# Patient Record
Sex: MALE
[De-identification: names, ages, dates, MRNs, and addresses within clinical notes are randomized per-mention and may not be internally consistent; named-entity substitution may affect disease eponyms.]

---

## 2022-11-29 ENCOUNTER — HOSPITAL ENCOUNTER (INPATIENT)
Dept: HOSPITAL 97 - ER | Age: 77
LOS: 4 days | Discharge: HOME | DRG: 291 | End: 2022-12-03
Attending: HOSPITALIST | Admitting: HOSPITALIST
Payer: COMMERCIAL

## 2022-11-29 VITALS — BODY MASS INDEX: 26.3 KG/M2

## 2022-11-29 DIAGNOSIS — R77.8: ICD-10-CM

## 2022-11-29 DIAGNOSIS — I27.20: ICD-10-CM

## 2022-11-29 DIAGNOSIS — Z79.899: ICD-10-CM

## 2022-11-29 DIAGNOSIS — D63.8: ICD-10-CM

## 2022-11-29 DIAGNOSIS — I48.91: ICD-10-CM

## 2022-11-29 DIAGNOSIS — D63.1: ICD-10-CM

## 2022-11-29 DIAGNOSIS — I43: ICD-10-CM

## 2022-11-29 DIAGNOSIS — Z79.84: ICD-10-CM

## 2022-11-29 DIAGNOSIS — N18.31: ICD-10-CM

## 2022-11-29 DIAGNOSIS — Z20.822: ICD-10-CM

## 2022-11-29 DIAGNOSIS — I50.23: ICD-10-CM

## 2022-11-29 DIAGNOSIS — Z79.01: ICD-10-CM

## 2022-11-29 DIAGNOSIS — I13.0: Primary | ICD-10-CM

## 2022-11-29 DIAGNOSIS — K21.9: ICD-10-CM

## 2022-11-29 DIAGNOSIS — E78.5: ICD-10-CM

## 2022-11-29 DIAGNOSIS — I24.8: ICD-10-CM

## 2022-11-29 DIAGNOSIS — E11.22: ICD-10-CM

## 2022-11-29 LAB
ALBUMIN SERPL BCP-MCNC: 3.1 G/DL (ref 3.4–5)
ALP SERPL-CCNC: 117 U/L (ref 45–117)
ALT SERPL W P-5'-P-CCNC: 18 U/L (ref 12–78)
AST SERPL W P-5'-P-CCNC: 20 U/L (ref 15–37)
BUN BLD-MCNC: 26 MG/DL (ref 7–18)
GLUCOSE SERPLBLD-MCNC: 220 MG/DL (ref 74–106)
HCT VFR BLD CALC: 38.7 % (ref 39.6–49)
INR BLD: 1.3
INR BLD: 1.35
LYMPHOCYTES # SPEC AUTO: 1.2 K/UL (ref 0.7–4.9)
MAGNESIUM SERPL-MCNC: 1.7 MG/DL (ref 1.8–2.4)
MCV RBC: 85.1 FL (ref 80–100)
PMV BLD: 10.2 FL (ref 7.6–11.3)
POTASSIUM SERPL-SCNC: 4.4 MMOL/L (ref 3.5–5.1)
RBC # BLD: 4.55 M/UL (ref 4.33–5.43)
TROPONIN I SERPL HS-MCNC: 185.5 PG/ML (ref ?–58.9)
TSH SERPL DL<=0.05 MIU/L-ACNC: 1.41 UIU/ML (ref 0.36–3.74)

## 2022-11-29 PROCEDURE — 94762 N-INVAS EAR/PLS OXIMTRY CONT: CPT

## 2022-11-29 PROCEDURE — 96375 TX/PRO/DX INJ NEW DRUG ADDON: CPT

## 2022-11-29 PROCEDURE — 84439 ASSAY OF FREE THYROXINE: CPT

## 2022-11-29 PROCEDURE — 71045 X-RAY EXAM CHEST 1 VIEW: CPT

## 2022-11-29 PROCEDURE — 85730 THROMBOPLASTIN TIME PARTIAL: CPT

## 2022-11-29 PROCEDURE — 80053 COMPREHEN METABOLIC PANEL: CPT

## 2022-11-29 PROCEDURE — 36415 COLL VENOUS BLD VENIPUNCTURE: CPT

## 2022-11-29 PROCEDURE — 93005 ELECTROCARDIOGRAM TRACING: CPT

## 2022-11-29 PROCEDURE — 84100 ASSAY OF PHOSPHORUS: CPT

## 2022-11-29 PROCEDURE — 99285 EMERGENCY DEPT VISIT HI MDM: CPT

## 2022-11-29 PROCEDURE — 85025 COMPLETE CBC W/AUTO DIFF WBC: CPT

## 2022-11-29 PROCEDURE — 84443 ASSAY THYROID STIM HORMONE: CPT

## 2022-11-29 PROCEDURE — 94003 VENT MGMT INPAT SUBQ DAY: CPT

## 2022-11-29 PROCEDURE — 94002 VENT MGMT INPAT INIT DAY: CPT

## 2022-11-29 PROCEDURE — 80076 HEPATIC FUNCTION PANEL: CPT

## 2022-11-29 PROCEDURE — 84484 ASSAY OF TROPONIN QUANT: CPT

## 2022-11-29 PROCEDURE — 81003 URINALYSIS AUTO W/O SCOPE: CPT

## 2022-11-29 PROCEDURE — 83735 ASSAY OF MAGNESIUM: CPT

## 2022-11-29 PROCEDURE — 87040 BLOOD CULTURE FOR BACTERIA: CPT

## 2022-11-29 PROCEDURE — 85610 PROTHROMBIN TIME: CPT

## 2022-11-29 PROCEDURE — 80048 BASIC METABOLIC PNL TOTAL CA: CPT

## 2022-11-29 PROCEDURE — 97116 GAIT TRAINING THERAPY: CPT

## 2022-11-29 PROCEDURE — 83880 ASSAY OF NATRIURETIC PEPTIDE: CPT

## 2022-11-29 PROCEDURE — 87811 SARS-COV-2 COVID19 W/OPTIC: CPT

## 2022-11-29 PROCEDURE — 97161 PT EVAL LOW COMPLEX 20 MIN: CPT

## 2022-11-29 PROCEDURE — 96374 THER/PROPH/DIAG INJ IV PUSH: CPT

## 2022-11-29 PROCEDURE — 82550 ASSAY OF CK (CPK): CPT

## 2022-11-29 PROCEDURE — 93306 TTE W/DOPPLER COMPLETE: CPT

## 2022-11-29 PROCEDURE — 82947 ASSAY GLUCOSE BLOOD QUANT: CPT

## 2022-11-29 PROCEDURE — 94760 N-INVAS EAR/PLS OXIMETRY 1: CPT

## 2022-11-29 PROCEDURE — 97530 THERAPEUTIC ACTIVITIES: CPT

## 2022-11-29 RX ADMIN — METOPROLOL TARTRATE SCH MG: 25 TABLET ORAL at 22:32

## 2022-11-29 RX ADMIN — Medication SCH ML: at 22:32

## 2022-11-29 RX ADMIN — HEPARIN SODIUM AND DEXTROSE SCH MLS: 5000; 5 INJECTION INTRAVENOUS at 23:42

## 2022-11-29 RX ADMIN — HUMAN INSULIN SCH: 100 INJECTION, SOLUTION SUBCUTANEOUS at 21:00

## 2022-11-29 RX ADMIN — SODIUM CHLORIDE SCH MG: 0.9 INJECTION, SOLUTION INTRAVENOUS at 22:33

## 2022-11-29 NOTE — XMS REPORT
Continuity of Care Document

                          Created on:2022



Patient:REYES, LUCAS

Sex:Male

:1945

External Reference #:[id number]





Demographics







                          Address                   [address] [place] [address]

 

                          Home Phone                [phone]

 

                          Work Phone                [phone]

 

                          Mobile Phone              [phone]

 

                          Email Address             NONE

 

                          Preferred Language        en

 

                          Marital Status            Unknown

 

                          Moravian Affiliation     Unknown

 

                          Race                      Unknown

 

                          Additional Race(s)        White

 

                          Ethnic Group              Not  or 









Author







                          Organization              [facility]

t

 

                          Address                   [address]



                                                    [place] [address]

 

                          Phone                     [phone]









Support







                Name            Relationship    Address         Phone

 

                Reyes, Lenor    Spouse          [address]   [phone]



                                                [place] [address] 









Care Team Providers







                    Name                Role                Phone

 

                    Clinton Valdez ([place]) Primary Care Physician [phone]

 

                    MARIA LUISA CARDENAS    Attending Clinician Unavailable

 

                    Doctor Unassigned, No Name Attending Clinician Unavailable

 

                    Arielle Peralta RN     Attending Clinician [phone]

 

                    Maria Luisa Cardenas MD Attending Clinician [phone]

 

                    Loretta Montalvo MD   Attending Clinician [phone]

 

                    Harshad Lackey DO    Attending Clinician [phone]

 

                    LORETTA MONTALVO      Admitting Clinician Unavailable

 

                    Loretta Montalvo MD   Admitting Clinician [phone]









Payers







           Payer Name Policy Type Policy Number Effective Date Expiration Date S

[employer]

 

           COMMERCIAL            [id number]  2014            



           NON-CONTRACT                       00:00:00              



           GENERIC                                                







Problems







       Condition Condition Condition Status Onset  Resolution Last   Treating Co

mments 

Source



       Name   Details Category        Date   Date   Treatment Clinician        



                                                 Date                 

 

       Atrial Atrial Disease Active                              [facility]



       fibrillati fibrillati                                              it

y of



       on with on with               00::                             [place]



       [facility]    [facility]                                                   [facility]



                                                                      Branch

 

       Essential Essential Disease Active                              Uni

vers



       hypertensi hypertensi                                              it

y of



       on     on                   00:00:                             [place]



                                                                    Medical



                                                                      Branch

 

       Cardiomyop Cardiomyop Disease Active                              U

nivers



       athy   athy                                                ity of



                                   00:00:                             [place]



                                                                    Medical



                                                                      Branch

 

       Dyslipidem Dyslipidem Disease Active 2021-0                             U

nivers



       ia     ia                                                  ity of



                                   00:00:                             [place]



                                                                    Medical



                                                                      Branch

 

       Elevated Elevated Disease Active                              Unive

rs



       troponin I troponin I                                              it

y of



       level  level                00:00:                             [place]



                                   00                                 Medical



                                                                      Branch

 

       Myocardial Myocardial Disease Active                              U

nivers



       infarction infarction                                              it

y of



       type 2 type 2               00:00:                             [place]



                                                                    Medical



                                                                      Branch

 

       Acute  Acute  Disease Active                              [facility]



       combined combined                                              ity of



       systolic systolic               00:00:                             [place]



       and    and                  00                                 Medical



       diastolic diastolic                                                  [facility]

ch



       CHF, NYHA CHF, NYHA                                                  



       class 3 class 3                                                  

 

       Stage 3 Stage 3 Disease Active                              [facility]



       chronic chronic                                              ity of



       kidney kidney               00:00:                             [place]



       disease disease               00                                 [facility]

 

       Pulmonary Pulmonary Disease Active                              Uni

vers



       hypertensi hypertensi                                              it

y of



       on     on                   00:00:                             [place]



                                                                    [facility]

 

       Pulmonary Pulmonary Disease Active                              Uni

vers



       edema, edema,                                              ity of



       acute  acute                00:00:                             [place]



                                                                    [facility]







Allergies, Adverse Reactions, Alerts







       Allergy Allergy Status Severity Reaction(s) Onset  Inactive Treating Comm

ents 

Source



       Name   Type                        Date   Date   Clinician        

 

       NO KNOWN Drug   Active                                           [facility]



       ALLERGIE Class                                                   ity of



       S                                                              [facility]







Social History







           Social Habit Start Date Stop Date  Quantity   Comments   Source

 

           Exposure to                       Not sure              [facility]



           SARS-CoV-2                                             [facility]



           (event)                                                Branch

 

           Education  2021 15                    [facility] of



                      00:00:00   00:00:00                         [facility]

 

           Tobacco Comment 2021 QUIT 30 YEARS            Univer

sity of



                      00:00:00   00:00:00   AGO                   [facility]

 

           Sex Assigned At 1945                       [facility]

y of



           Birth      00:00:00   00:00:00                         [facility]









                Smoking Status  Start Date      Stop Date       Source

 

                Former smoker   2021 00:00:00 2021 00:00:00 Univers[facility]

ty of [facility]







Medications







       Ordered Filled Start  Stop   Current Ordering Indication Dosage Frequency

 Signature

                    Comments            Components          Source



     Medication Medication Date Date Medication? Clinician                (SIG) 

          



     Name Name                                                   

 

     ergocalcife      2021- No        [id number]      Take 1        

   [facility]



     rol,                                capsule by           ity of



     vitamin d2,      00:00: 04:59                          mouth           [place]

[facility]



     1,250 mcg      00   :00                           weekly for           Medi

[place]



     (50,000                                         30 days.           Branch



     unit)                                                        



     capsule                                                        

 

     ergocalcife      -0 - No        [id number]      Take 1        

   [facility]



     rol,                                capsule by           ity of



     vitamin d2,      00:00: 04:59                          mouth           [place]

[facility]



     1,250 mcg      00   :00                           weekly for           Medi

[place]lee



     (50,000                                         30 days.           Branch



     unit)                                                        



     capsule                                                        

 

     ergocalcife      -0 - No        [id number]      Take 1        

   [facility]



     rol,                                capsule by           ity of



     vitamin d2,      00:00: 04:59                          mouth           [place]

[facility]



     1,250 mcg      00   :00                           weekly for           Medi

[place]lee



     (50,000                                         30 days.           Branch



     unit)                                                        



     capsule                                                        

 

     KCL             Yes            20meq      20 mEq,           [facility]



     (KLOR-CON      8-03                               Oral,           ity of



     M20) tablet      14:00:                               DAILY,           [place]

[facility]



     20 mEq      00                                 First dose           Medical



                                                  on            [facility]



                                                  8/3/21 at           



                                                  0900,           



                                                  Until           



                                                  Discontinu           



                                                  ed,            



                                                  Routine           

 

     metformin            Yes            500mg      Take 500           Uni

vers



     HCl       8-03                               mg by           ity of



     (METFORMIN      00:40:                               mouth 2           [place]

s



     ORAL)      55                                 (two)           Medical



                                                  times           Branch



                                                  daily.           

 

     allopurinoL            Yes            300mg      Take 300           U

nivers



     300 mg      8-03                               mg by           ity of



     tablet      00:40:                               mouth           [place]



               55                                 daily.           Medical



                                                                 Branch

 

     atorvastati            Yes            20mg      Take 20 mg           

Univers



     n 20 mg      8-03                               by mouth           ity of



     tablet      00:40:                               at             [place]



               55                                 bedtime.           Medical



                                                                 Branch

 

     metformin            Yes            500mg      Take 500           Uni

vers



     HCl       8-03                               mg by           ity of



     (METFORMIN      00:40:                               mouth 2           [place]

s



     ORAL)      55                                 (two)           Medical



                                                  times           Branch



                                                  daily.           

 

     allopurinoL            Yes            300mg      Take 300           U

nivers



     300 mg      8-03                               mg by           ity of



     tablet      00:40:                               mouth           [place]



               55                                 daily.           Medical



                                                                 Branch

 

     atorvastati            Yes            20mg      Take 20 mg           

Univers



     n 20 mg      8-03                               by mouth           ity of



     tablet      00:40:                               at             [place]



               55                                 bedtime.           Medical



                                                                 Branch

 

     metformin            Yes            500mg      Take 500           Uni

vers



     HCl       8-03                               mg by           ity of



     (METFORMIN      00:40:                               mouth 2           [place]

s



     ORAL)      55                                 (two)           Medical



                                                  times           Branch



                                                  daily.           

 

     allopurinoL            Yes            300mg      Take 300           U

nivers



     300 mg      8-03                               mg by           ity of



     tablet      00:40:                               mouth           [place]



               55                                 daily.           Medical



                                                                 Branch

 

     atorvastati            Yes            20mg      Take 20 mg           

Univers



     n 20 mg      03                               by mouth           ity of



     tablet      00:40:                               at             [place]



               55                                 bedtime.           Medical



                                                                 Branch

 

     atenoloL      2021- No        [id number] 100mg      Take 1           U

nivers



     100 mg                                tablet by           ity of



     tablet      00:00: 04:59                          mouth           [place]



               00   :00                           daily for           Medical



                                                  30 days.           Branch

 

     KCL 20 mEq      2021- No        [id number] 20meq      Take 1          

 Univers



     tablet                                tablet by           ity of



               00:00: 04:59                          mouth           [place]



               00   :00                           daily for           Medical



                                                  30 days.           Branch

 

     NIFEdipine      2021- No        [id number] 90mg      Take 1           

Univers



     ER 90 mg                                tablet by           ity o

f



     tablet      00:00: 04:59                          mouth           [place]



               00   :00                           daily for           Medical



                                                  30 days.           Branch

 

     atenoloL      2021- No        [id number] 100mg      Take 1           U

nivers



     100 mg                                tablet by           ity of



     tablet      00:00: 04:59                          mouth           [place]



               00   :00                           daily for           Medical



                                                  30 days.           Branch

 

     KCL 20 mEq      2021- No        [id number] 20meq      Take 1          

 Univers



     tablet                                tablet by           ity of



               00:00: 04:59                          mouth           [place]



               00   :00                           daily for           Medical



                                                  30 days.           Branch

 

     NIFEdipine      2021- No        [id number] 90mg      Take 1           

[facility]



     ER 90 mg                                tablet by           ity o

f



     tablet      00:00: 04:59                          mouth           [place]



               00   :00                           daily for           Medical



                                                  30 days.           Branch

 

     atenoloL      2021- No        [id number] 100mg      Take 1           U

nivers



     100 mg                                tablet by           ity of



     tablet      00:00: 04:59                          mouth           [place]



               00   :00                           daily for           Medical



                                                  30 days.           Branch

 

     KCL 20 mEq      2021- No        [id number] 20meq      Take 1          

 Univers



     tablet                                tablet by           ity of



               00:00: 04:59                          mouth           [place]



               00   :00                           daily for           Medical



                                                  30 days.           Branch

 

     NIFEdipine      2021- No        [id number] 90mg      Take 1           

Univers



     ER 90 mg                                tablet by           ity o

f



     tablet      00:00: 04:59                          mouth           [place]



               00   :00                           daily for           Medical



                                                  30 days.           Branch

 

     atenoloL      2021- No             100mg      Take 100           Uni

vers



     100 mg                                mg by           ity of



     tablet      19:51: 00:00                          mouth           [place]



               58   :00                           daily.           Medical



                                                                 Branch

 

     NIFEdipine      2021- No             90mg      Take 90 mg           

[facility]



     ER 90 mg                                by mouth           ity of



     tablet      19:51: 00:00                          daily.           [place]



               58   :00                                          Medical



                                                                 Branch

 

     ergocalcife      2021- No             [id number]      Take 1,250        

   [facility]



     rol,                                mcg by           ity of



     vitamin D2,      19:51: 00:00                          mouth           [place]

[facility]



     (ERGOCAL)      58   :00                           weekly.           Medical



     62.5 mcg                                                        Branch



     (2,500                                                        



     unit) Cap                                                        

 

     hydrALAZINE      2021- No             10mg      Take 10 mg          

 [facility]



     10 mg                                by mouth 3           ity of



     tablet      19:51: 00:00                          (three)           [place]



               58   :00                           times           Medical



                                                  daily.           Branch

 

     metformin            Yes            500mg      Take 500           Uni

vers



     HCl       8-02                               mg by           ity of



     (METFORMIN      19:40:                               mouth 2           [place]

s



     ORAL)      55                                 (two)           Medical



                                                  times           Branch



                                                  daily.           

 

     allopurinoL            Yes            300mg      Take 300           U

nivers



     300 mg      8-02                               mg by           ity of



     tablet      19:40:                               mouth           [place]



               55                                 daily.           Medical



                                                                 Branch

 

     atorvastati            Yes            20mg      Take 20 mg           

Univers



     n 20 mg      02                               by mouth           ity of



     tablet      19:40:                               at             [place]



               55                                 bedtime.           Medical



                                                                 Branch

 

     apixaban            Yes            5mg       5 mg,           [facility]



     (ELIQUIS)      8-                               Oral, BID,           ity 

of



     tablet 5 mg      17:44:                               First dose           

[place]



               21                                 on Mon           Medical



                                                  21 at           [facility]



                                                  ,           



                                                  Until           



                                                  Discontinu           



                                                  ed,            



                                                  Routine           

 

     furosemide            Yes       [id number] 40mg      Take 1           U

nivers



     40 mg      8-02                               tablet by           ity of



     tablet      00:00:                               mouth           [place]



               00                                 daily.           Medical



                                                                 Branch

 

     furosemide      -      Yes       [id number] 40mg      Take 1           U

nivers



     40 mg      8-02                               tablet by           ity of



     tablet      00:00:                               mouth           [place]



               00                                 daily.           Medical



                                                                 Branch

 

     furosemide      -0      Yes       [id number] 40mg      Take 1           U

nivers



     40 mg      8-                               tablet by           ity of



     tablet      00:00:                               mouth           [place]



               00                                 daily.           Medical



                                                                 Branch

 

     furosemide            Yes       [id number] 40mg      Take 1           U

nivers



     40 mg      8-                               tablet by           ity of



     tablet      00:00:                               mouth           [place]



               00                                 daily.           Medical



                                                                 Branch

 

     apixaban 5      -2021- No        1475 5mg       Take 1           Univ

ers



     mg tablet                                tablet by           ity 

of



               00:00: 04:59                          mouth 2           [place]



               00   :00                           (two)           Medical



                                                  times           [facility]



                                                  daily for           



                                                  30 days.           



                                                  Indication           



                                                  s: DVT           



                                                  prevention           

 

     hydrALAZINE      -2021- No        [id number] 25mg      Take 1          

 [facility]



     25 mg                                tablet by           [facility] of



     tablet      00:00: 04:59                          mouth 2           [place]



               00   :00                           (two)           Medical



                                                  times           [facility]



                                                  daily for           



                                                  30 days.           

 

     apixaban 5      -2021- No        1475 5mg       Take 1           [facility]

ers



     mg tablet                                tablet by           ity 

of



               00:00: 04:59                          mouth 2           [place]



               00   :00                           (two)           Medical



                                                  times           [facility]



                                                  daily for           



                                                  30 days.           



                                                  Indication           



                                                  s: DVT           



                                                  prevention           

 

     hydrALAZINE      -2021- No        [id number] 25mg      Take 1          

 [facility]



     25 mg                                tablet by           [facility]



     tablet      00:00: 04:59                          mouth 2           [place]



               00   :00                           (two)           Medical



                                                  times           [facility]



                                                  daily for           



                                                  30 days.           

 

     apixaban 5      2021- No        1475 5mg       Take 1           Univ

ers



     mg tablet                                tablet by           ity 

of



               00:00: 04:59                          mouth 2           [place]



               00   :00                           (two)           Medical



                                                  times           [facility]



                                                  daily for           



                                                  30 days.           



                                                  Indication           



                                                  s: DVT           



                                                  prevention           

 

     hydrALAZINE      -2021- No        [id number] 25mg      Take 1          

 [facility]



     25 mg      -                          tablet by           ity of



     tablet      00:00: 04:59                          mouth 2           [place]



               00   :00                           (two)           Medical



                                                  times           [facility]



                                                  daily for           



                                                  30 days.           

 

     furosemide      -2021- No        [id number] 40mg      Take 1           

[facility]



     40 mg                                tablet by           ity of



     tablet      00:00: 00:00                          mouth           [place]



               00   :00                           every           Medical



                                                  morning           [facility]



                                                  and            



                                                  evening           



                                                  for 30           



                                                  days.           

 

     hydrALAZINE            Yes            25mg      25 mg,           Univ

ers



     (APRESOLINE                                     Oral, BID,           it

y of



     ) tablet 25      01:00:                               First dose           

[place]



     mg        00                                 (after           Medical



                                                  last           [facility]



                                                  modificati           



                                                  on) on Sat           



                                                  21 at           



                                                  2000,           



                                                  Until           



                                                  Discontinu           



                                                  ed,            



                                                  Routine           

 

     docusate            Yes            100mg      100 mg,           Unive

rs



     (COLACE)                                     Oral, BID,           ity o

f



     capsule 100      01:00:                               First dose           

[place]



     mg        00                                 on [facility]



                                                  21 at           [facility]



                                                  2000,           



                                                  Until           



                                                  Discontinu           



                                                  ed,            



                                                  Routine           

 

     aspirin      0 202- No             81mg      81 mg,           [facility]



     chewable       0802                          Oral,           ity of



     tablet 81      14:00: 17:44                          DAILY,           [place]



     mg        00   :34                           First dose           Medical



                                                  on [facility]



                                                  21 at           



                                                  0900,           



                                                  Until           



                                                  Discontinu           



                                                  ed,            



                                                  Routine           

 

     atorvastati            Yes            20mg      20 mg,           Univ

ers



     n (LIPITOR)                                     Oral, QHS,           it

y of



     tablet 20      02:00:                               First dose           [place]

[place]



     mg        00                                 on Fri           Medical



                                                  21 at           [facility]anch



                                                  2100,           



                                                  Until           



                                                  Discontinu           



                                                  ed,            



                                                  Routine           

 

     sulfur      2021- No        [id number] 5mL       5 mL,           Unive

rs



     hexafluorid      30                          Intravenou           i

ty of



     e microsphr      19:30: 19:30                          s, ONCE, 1          

 [place]



     (LUMASON)      00   :00                           dose, Fri           Medic

al



     injection 5                                         21 at           Br

anch



     mL                                           1430,           



                                                  Routine<br           



                                                  >Faculty           



                                                  member           



                                                  approving           



                                                  Restricted           



                                                  medication           



                                                  : [employer]           

 

     magnesium      0      Yes            400mg      400 mg,           Univ

ers



     oxide                                     Oral, BID,           ity of



     (MAG-OX      18:00:                               First dose           [place]

[facility]



     400) tablet      00                                 (after           Medica

l



     400 mg                                         last           [facility]



                                                  reorder)           



                                                  on 21 at           



                                                  1300,           



                                                  Until           



                                                  Discontinu           



                                                  ed,            



                                                  Routine           

 

     NIFEdipine      0      Yes            90mg      90 mg,           Unive

rs



     ER tablet                                     Oral,           ity of



     90 mg      14:00:                               DAILY,           [place]



               00                                 First dose           Medical



                                                  on Fri           Branch



                                                  21 at           



                                                  0900,           



                                                  Until           



                                                  Discontinu           



                                                  ed,            



                                                  Routine           

 

     ergocalcife            Yes            32548R      50,000           Un

richy



     rol                                      Units,           ity of



     (vitamin      14:00:                               Oral,           [place]



     d2)       00                                 QWEEKLY,           Medical



     (CALCIFEROL                                         First dose           Br

anch



     ) capsule                                         on 21 at           



     Units                                         0900,           



                                                  Until           



                                                  Discontinu           



                                                  ed             

 

     atenoloL            Yes            100mg      100 mg,           Unive

rs



     (TENORMIN)                                     Oral,           ity of



     tablet 100      14:00:                               DAILY,           [place]



     mg        00                                 First dose           Medical



                                                  on Fri           Branch



                                                  21 at           



                                                  0900,           



                                                  Until           



                                                  Discontinu           



                                                  ed,            



                                                  Routine           

 

     allopurinoL            Yes            300mg      300 mg,           Un

richy



     (ZYLOPRIM)                                     Oral,           ity of



     tablet 300      14:00:                               DAILY,           [place]



     mg        00                                 First dose           Medical



                                                  on Fri           Branch



                                                  21 at           



                                                  0900,           



                                                  Until           



                                                  Discontinu           



                                                  ed,            



                                                  Routine           

 

     furosemide            Yes            40mg      40 mg,           Unive

rs



     (LASIX)                                     Slow IV           ity of



     injection      13:00:                               Push,           [place]



     40 mg      00                                 Q12H,           Medical



                                                  First dose           [facility]



                                                  on 21 at           



                                                  0800,           



                                                  Until           



                                                  Discontinu           



                                                  ed,            



                                                  Routine           

 

     enoxaparin      2021- No             1mg/kg      90 mg           Uni

vers



     (LOVENOX)       08-02                          (rounded           ity o

f



     injection      13:00: 17:44                          from 88.5           [facility]

[place]



     90 mg      00   :34                           mg = 1           Medical



                                                  mg/kg           Branch



                                                  ?88.5 kg),           



                                                  Subcutaneo           



                                                  , Q12H,           



                                                  First dose           



                                                  on 21 at           



                                                  0800,           



                                                  Until           



                                                  Discontinu           



                                                  ed,            



                                                  Routine           

 

     hydrALAZINE      2021- No             10mg      10 mg,           Uni

vers



     (APRESOLINE       0731                          Oral, TID,           i

ty of



     ) tablet 10      13:00: 16:29                          First dose          

 [place]



     mg        00   :21                           on Fri           Medical



                                                  21 at           Branch



                                                  0800,           



                                                  Until           



                                                  Discontinu           



                                                  ed,            



                                                  Routine           

 

     Sliding            Yes                      Subcutaneo           Univ

ers



     Scale                                     us, AC,           ity of



     Insulin-Reg      12:30:                               First dose           

[place]



     ular + Fsbg      00                                 on Fri           Medica

l



     Testing                                         21 at           Branch



                                                  0730,           



                                                  Until           



                                                  Discontinu           



                                                  ed,            



                                                  Routine           

 

     hydralAZINE            Yes            10mg      10 mg,           Univ

ers



     (APRESOLINE      7-30                               Slow IV           ity o

f



     ) injection      11:12:                               Push,           [place]



     10 mg      46                                 Q6HPRN,           Medical



                                                  Starting           [facility]



                                                  Fri            



                                                  21 at           



                                                  0612,           



                                                  Until           



                                                  Discontinu           



                                                  ed, STAT,           



                                                  DBP=>100;           



                                                  SBP=>160,           



                                                  DBP=>100;           



                                                  SBP=>180<b           



                                                  r>Indicati           



                                                  on:            



                                                  Hypertensi           



                                                  ve             



                                                  Emergency           

 

     glucagon            Yes            1mg       1 mg,           [facility]



     (GLUCAGEN      730                               Intramuscu           ity 

of



     DIAGNOSTIC      10:23:                               lar, PRN,           [facility]

[facility]



     KIT)      30                                 Starting           Medical



     injection 1                                         Fri            [facility]



     mg                                           21 at           



                                                  0523,           



                                                  Until           



                                                  Discontinu           



                                                  ed, ASAP,           



                                                  Blood           



                                                  Glucose           



                                                  &lt; or =           



                                                  70 mg/dL           



                                                  and            



                                                  patient is           



                                                  unable to           



                                                  swallow or           



                                                  has mental           



                                                  changes.           

 

     dextrose 50            Yes            25mL      25 mL,           Univ

ers



     % in water      730                               Slow IV           ity of



     (D50W)      10:23:                               Push, PRN,           [place]



     injection      30                                 Starting           Medica

l



     25 mL                                         Fri            Branch



                                                  21 at           



                                                  0523,           



                                                  Until           



                                                  Discontinu           



                                                  ed, ASAP,           



                                                  Blood           



                                                  Glucose           



                                                  &lt; or =           



                                                  70 mg/dL           



                                                  and            



                                                  patient is           



                                                  unable to           



                                                  swallow or           



                                                  has mental           



                                                  status           



                                                  changes.           

 

     ondansetron            Yes            4mg       4 mg, Slow           

[facility]



     (ZOFRAN      730                               IV Push,           ity of



     (PF))      10:23:                               Q6HPRN,           [place]



     injection 4      17                                 Starting           Medi

[place]



     mg                                           Fri            [facility]



                                                  21 at           



                                                  0523,           



                                                  Until           



                                                  Discontinu           



                                                  ed,            



                                                  Routine,           



                                                  Nausea and           



                                                  Vomiting           



                                                  (N/V)           

 

     traMADoL      2021- No             50mg      50 mg,           Univer

s



     (ULTRAM)       08                          Oral,           ity of



     tablet 50      10:23: 10:22                          Q8HPRN,           [place]

[place]



     mg        09   :09                           Starting           Medical



                                                  Fri            [facility]



                                                  21 at           



                                                  0523,           



                                                  Until Sun           



                                                  21 at           



                                                  0522,           



                                                  Routine,           



                                                  Pain           



                                                  (scale           



                                                  4-6)           

 

     acetaminoph            Yes            650mg      650 mg,           Un

richy



     en                                       Oral,           ity of



     (TYLENOL)      10:23:                               Q6HPRN,           [place]



     tablet 650      05                                 Starting           Medic

al



     mg                                           Fri            [facility]anch



                                                  21 at           



                                                  0523,           



                                                  Until           



                                                  Discontinu           



                                                  ed,            



                                                  Routine,           



                                                  Pain           



                                                  (scale           



                                                  1-3)           

 

     magnesium      2021-0 2021- No             800mg      800 mg,           Uni

vers



     oxide                                Oral, ONCE           ity of



     (MAG-OX      10:00: 08:54                          NOW, 1           [place]



     400) tablet      00   :00                           dose, Fri           Med

ical



     800 mg                                         21 at           Branch



                                                  0500,           



                                                  Routine           

 

     metoprolol      2021- No             50mg      50 mg,           Univ

ers



     tartrate                                Oral,           ity of



     (LOPRESSOR)      08:45: 07:44                          ONCE, 1           Te

x[place]



     tablet 50      00   :00                           dose, Fri           Medic

al



     mg                                           21 at           Branch



                                                  0345,           



                                                  Routine           

 

     labetaloL      2021- No             .5mg/mi      0.5-4           Uni

vers



     (NORMODYNE)                      n         mg/min           ity o

f



     200 mg in      06:40: 10:19                          (           [place]

s



     NaCl 0.9%      10   :20                           mL/hr), IV           Medi

[place]



     (NS) 100 mL                                         Infusion,           Bra

[facility]



     infusion                                         TITRATE,           



                                                  DBP Goal <           



                                                  110 mmHg,           



                                                  Starting           



                                                  21 at           



                                                  0140<br>In           



                                                  itiate           



                                                  infusion           



                                                  at 0.5           



                                                  mg/min.&nb           



                                                  sp;&nbsp;T           



                                                  itrate by           



                                                  0.5 mg/min           



                                                  every 5           



                                                  minutes to           



                                                  15 minutes           



                                                  as needed           



                                                  to achieve           



                                                  and            



                                                  maintain           



                                                  goal blood           



                                                  pressure.&           



                                                  nbsp;&nbsp           



                                                  ;Maximum           



                                                  dose = 4           



                                                  mg/min. If           



                                                  goal not           



                                                  maintained           



                                                  at maximum           



                                                  allowed           



                                                  dose,           



                                                  contact           



                                                  prescriber           



                                                  .<br>           

 

     furosemide       No             80mg      80 mg, IV           U

nivers



     (LASIX)                                Push,           ity of



     injection      05:15: 04:20                          ONCE, 1           [place]

s



     80 mg      00   :00                           dose, Fri           Medical



                                                  21 at           Branch



                                                  0015, ASAP           







Vital Signs







             Vital Name   Observation Time Observation Value Comments     Source

 

             Systolic blood 2021 15:59:00 123 mm[Hg]                Univer

sity of



             pressure                                            [facility]

 

             Diastolic blood 2021 15:59:00 67 mm[Hg]                 Unive

rsity of



             pressure                                            [facility]

 

             Heart rate   2021 15:59:00 86 /min                   [facility]

 

             Body temperature 2021 15:59:00 37.11 Conchita                 [facility]

 

             Respiratory rate 2021 15:59:00 18 /min                   [facility]

 

             Oxygen saturation in 2021 15:59:00 91 /min                   

[facility]



             Arterial blood by                                        [place] [facility]

[place]



             Pulse oximetry                                        Branch

 

             Body weight  2021 08:38:00 113.399 kg                [facility]







Procedures







                Procedure       Date / Time     Performing Clinician Source



                                Performed                       

 

                AUTHORIZATION FOR 2022 05:01:00 Doctor Unassigned, No [facility]



                RELEASE OF PHI                  Name            [facility]

 

                POCT GLUCOSE (AUTOMATED) 2021 15:58:00 [facility]

 

                POCT GLUCOSE (AUTOMATED) 2021 12:42:00 [facility]

 

                BASIC METABOLIC PANEL 2021 10:15:00 Xander Castro [facility]



                (NA, K, CL, CO2,                                 [facility]



                GLUCOSE, BUN,                                   



                CREATININE, CA)                                 

 

                N-TERMINAL PRO-BNP 2021 10:15:00 Xander Castro [facility]

 

                CBC WITH DIFF   2021 08:40:00 Xander Castro [facility]

 

                POCT GLUCOSE (AUTOMATED) 2021 23:00:00 [facility] Lu[facility]ardenas [facility]

[facility]

 

                POCT GLUCOSE (AUTOMATED) 2021 16:33:00 [facility] Lu[facility]ardenas [facility]

[facility]

 

                BASIC METABOLIC PANEL 2021 14:45:00 [facility]uan Sol[facility]ario [facility] 

[facility]



                (NA, K, CL, CO2,                                 [facility]



                GLUCOSE, BUN,                                   



                CREATININE, CA)                                 

 

                N-TERMINAL PRO-BNP 2021 14:45:00 Tuan Solitario [facility]

 

                POCT GLUCOSE (AUTOMATED) 2021 12:42:00 [facility] Lu[facility]ardenas [facility]

 

                POCT GLUCOSE (AUTOMATED) 2021 08:56:00 Maria Luisa Cardenas Un

iversity [facility]

 

                POCT GLUCOSE (AUTOMATED) 2021 21:29:00 [facility]ardenas [facility]

[facility]

 

                POCT GLUCOSE (AUTOMATED) 2021 16:57:00 [facility] Lu[facility] Cardenas [facility]

[facility]

 

                POCT GLUCOSE (AUTOMATED) 2021 12:55:00 Maria Lu[facility] Cardenas [facility]

[facility]

 

                [facility]       [facility]

 

                TROPONIN I      [facility]59[facility]

 

                THYROID STIMULATING 2021 08:59:00 Tuan Solitario [facility]

 

                BASIC [facility]ABOLIC PANEL 2021 [facility]:59[facility]



                (NA, K, CL, CO2,                                 [facility] Branch



                GLUCOSE, BUN,                                   



                CREATININE, CA)                                 

 

                CBC WITH DIFF   2021 08:59:00 [facility]

 

                N-TERMINAL PRO[facility]BNP 2021 08:59:00 [facility]

 

                POCT GLUCOSE (AUTOMATED) 2021 21:32:00 [facility]arden[facility] 

[facility]

 

                TRANSTHORACIC ECHO (TTE) 2021 19:17:26 Tuan Solitario

 [facility]



                COMPLETE W/ CONTRAST                                 Medical Bra

[facility]

 

                POCT GLUCOSE (AUTOMATED) 2021 16:20:00 [facility]arden[facility] 

[facility]

 

                POCT GLUCOSE (AUTOMATED) 2021 12:50:00 Maria Luisa Cardenas [facility]

[facility]

 

                TROPONIN I      2021 12:01:00 [facility]

 

                TH[facility] 2021 [facility]:01:[facility]

 

                L[facility] PANEL     [facility]21 [facility]:01:[facility]



                ([id number])(TOTAL                                   Medical Branch



                CHOLESTEROL,                                    



                TRIGLYCERIDES, HDL)                                 

 

                XR CHEST 1 VW   2021 04:23:08 Maria Luisa Cardenas [facility]

 

                MAGNESIUM       2021 04:13:00 Maria Luisa Cardenas [facility]

 

                TROPONIN I      2021 04:13:00 Maria Luisa Cardenas [facility]

 

                COMP. METABOLIC PANEL 2021 04:13:00 Maria Luisa Cardenas [facility]



                ([id number])                                         Medical Branch

 

                CBC WITH DIFF   2021 04:13:00 Maria Luisa Cardenas [facility]

 

                GLYCOSYLATED HEMOGLOBIN 2021 04:13:00 Mechelle López 

[facility]



                (A1C)                                           [facility]

 

                N-TERMINAL PRO-BNP 2021 04:13:00 Maria Luisa Cardenas [facility]

 

                COVID-19 (ID NOW RAPID 2021 04:13:00 Maria Luisa Cardenas [facility]



                TESTING)                                        Medical Branch

 

                NOTICE OF PRIVACY 2021 04:12:15 Doctor Unassigned, No [facility]



                PRACTICES                       Name            Medical Branch

 

                CONSENT/REFUSAL FOR 2021 04:11:48 Doctor Unassigned, No [facility]

[facility]



                DIAGNOSIS AND TREATMENT                 Name            Medical 

Branch

 

                CONSENT/REFUSAL FOR 2021 04:11:43 Doctor Unassigned, No [facility]

[facility]



                DIAGNOSIS AND TREATMENT                 Name            Medical 

Branch

 

                CONSENT/REFUSAL FOR 2021 04:11:41 Doctor Unassigned, No [facility]

[facility]



                DIAGNOSIS AND TREATMENT                 Name            Medical 

[place]

 

                HB ECG ROUTINE & RHYTHM 2021 03:58:32 Maria Luisa Cardenas [facility]







Encounters







        Start   End     Encounter Admission Attending Care    Care    Encounter 

Source



        Date/Time Date/Time Type    Type    Clinicians Facility Department ID   

   

 

        2021         Inpatient X       RACHANAMA [facility]     [id number] 

Univers



        22:57:00                         MARIA LUISA rebollar [facility]

 

        2022 Orders          Doctor VILLELA    [id number]

[id number] Univers



        00:00:00 00:00:00 Only            Unassigned, NICHOLAS   [id number]       

  ity of



                                        [facility] [id number]         Jose

as



                                                        [phone]         Medi

haylee



                                                        [address]             Branch

 

        2021 Transition         Rosario Peralta  [id number]

[id number] Univers



        00:00:00 00:00:00 of Care         Arielle Rod   [id number]         it

y of



                                                José   [id number]         [place]

s



                                                        [phone]         Medi

haylee



                                                        403             Branch

 

        2021 Hospital         Maria Luisa Cardenas [facility]    [id number]

[id number] 

Univers



        22:57:00 19:35:00 Encounter         Loretta Montalvo [id number] 

        ity of



                                        Harshad Lackey [id number]       

  [facility]  [phone]         Medi

haylee



                                                        [address]             [place]







Results







           Test Description Test Time  Test Comments Results    Result Comments 

Source









                    POCT GLUCOSE (AUTOMATED) 2021 16:35:48 









                      Test Item  Value      Reference Range Interpretation Comme

nts









             POCT GLU (test code = 9983968809) 233 mg/dL           H      

      

 

             Lab Interpretation (test code = 46203-8) Abnormal                  

             



[id number] 14[facility]47:13





             Test Item    Value        Reference Range Interpretation Comments

 

             NT-proBNP (test code 2520 pg/mL   See_Comment  H             [Autom

ated



             = [id number])                                        message] The



                                                                 system which



                                                                 generated this



                                                                 result



                                                                 transmitted



                                                                 reference range

:



                                                                 <=450. The



                                                                 reference range



                                                                 was not used to



                                                                 interpret this



                                                                 result as



                                                                 normal/abnormal

.

 

             RAHEL (test code = RAHEL) Biotin has been                           



                          reported to                            



                          cause a negative                           



                          bias, interpret                           



                          results relative                           



                          to patient's use                           



                          of biotin.                             

 

             Lab Interpretation Abnormal                               



             (test code = 94707-6)                                        



[facility] 13[facility]:56





             Test Item    Value        Reference Range Interpretation Comments

 

             POCT GLU (test code = 0072008237) 135 mg/dL           H      

      

 

             Lab Interpretation (test code = Abnormal                           

    



             79137-1)                                            



[facility]51





             Test Item    Value        Reference Range Interpretation Comments

 

             POCT GLU (test code = 4280773191) 161 mg/dL           H      

      

 

             Lab Interpretation (test code = Abnormal                           

    



             28707-5)                                            



[facility] METABOLIC PANEL (NA, K, CL, CO2, 
GLUCOSE, BUN, CREATININE, CA)2021 11:30:41





             Test Item    Value        Reference Range Interpretation Comments

 

             NA (test code = 136 mmol/L   135-145                   



             7106425663)                                         

 

             K (test code = 3.2 mmol/L   3.5-5.0      L            



             9300449371)                                         

 

             CL (test code = 99 mmol/L                        



             1209541476)                                         

 

             CO2 TOTAL (test code = 26 mmol/L    23-31                     



             4661098947)                                         

 

             AGAP (test code =              2-16                      



             6329876156)                                         

 

             BUN (test code = 44 mg/dL     7-23         H            



             7084066463)                                         

 

             GLUCOSE (test code = 139 mg/dL           H            



             7239204746)                                         

 

             CREATININE (test code = 1.79 mg/dL   0.60-1.25    H            



             4842650272)                                         

 

             CALCIUM (test code = 8.5 mg/dL    8.6-10.6     L            



             8791789026)                                         

 

             eGFR (test code =              mL/min/1.73m2              



             0639232175)                                         

 

             RAHEL (test code = RAHEL) Association of                           



                          Glomerular Filtration                           



                          Rate (GFR) and Staging                           



                          of Kidney Disease*                           



                          +---------------------                           



                          --+-------------------                           



                          --+-------------------                           



                          ------+| GFR                           



                          (mL/min/1.73 m2) ?|                           



                          With Kidney Damage ?|                           



                          ?Without Kidney                           



                          Damage+---------------                           



                          --------+-------------                           



                          --------+-------------                           



                          ------------+| ?>90 ?                           



                          ? ? ? ? ? ? ? ?|                           



                          ?Stage one ? ? ? ? ?|                           



                          ? Normal ? ? ? ? ? ? ?                           



                          ?+--------------------                           



                          ---+------------------                           



                          ---+------------------                           



                          -------+| ?60-89 ? ? ?                           



                          ? ? ? ? ?| ?Stage two                           



                          ? ? ? ? ?| ? Decreased                           



                          GFR ? ? ? ?                            



                          +---------------------                           



                          --+-------------------                           



                          --+-------------------                           



                          ------+| ?30-59 ? ? ?                           



                          ? ? ? ? ?| ?Stage                           



                          three ? ? ? ?| ? Stage                           



                          three ? ? ? ? ?                           



                          +---------------------                           



                          --+-------------------                           



                          --+-------------------                           



                          ------+| ?15-29 ? ? ?                           



                          ? ? ? ? ?| ?Stage four                           



                          ? ? ? ? | ? Stage four                           



                          ? ? ? ? ?                              



                          ?+--------------------                           



                          ---+------------------                           



                          ---+------------------                           



                          -------+| ?<15 (or                           



                          dialysis) ? ?| ?Stage                           



                          five ? ? ? ? | ? Stage                           



                          five ? ? ? ? ?                           



                          ?+--------------------                           



                          ---+------------------                           



                          ---+------------------                           



                          -------+ *Each stage                           



                          assumes the associated                           



                          GFR level has been in                           



                          effect for at least                           



                          three months. ?Stages                           



                          1 to 5, with or                           



                          without kidney                           



                          disease, indicate                           



                          chronic kidney                           



                          disease. Notes:                           



                          Determination of                           



                          stages one and two                           



                          (with eGFR                             



                          >59mL/min/1.73 m2)                           



                          requires estimation of                           



                          kidney damage for at                           



                          least three months as                           



                          defined by structural                           



                          or functional                           



                          abnormalities of the                           



                          kidney, manifested by                           



                          either:Pathological                           



                          abnormalities or                           



                          Markers of kidney                           



                          damage (including                           



                          abnormalities in the                           



                          composition of the                           



                          blood or urine or                           



                          abnormalities in                           



                          imaging tests).                           

 

             Lab Interpretation Abnormal                               



             (test code = 66085-5)                                        



[facility] WITH [id number] 09:53:57





             Test Item    Value        Reference Range Interpretation Comments

 

             WBC (test code =              See_Comment                [Automated



             6690-2)                                             message] The sy

stem



                                                                 which generated



                                                                 this result



                                                                 transmitted



                                                                 reference range

:



                                                                 4.20 - 10.70



                                                                 10*3/?L. The



                                                                 reference range

 was



                                                                 not used to



                                                                 interpret this



                                                                 result as



                                                                 normal/abnormal

.

 

             RBC (test code =              See_Comment  L             [Automated



             789-8)                                              message] The sy

stem



                                                                 which generated



                                                                 this result



                                                                 transmitted



                                                                 reference range

:



                                                                 4.26 - 5.52



                                                                 10*6/?L. The



                                                                 reference range

 was



                                                                 not used to



                                                                 interpret this



                                                                 result as



                                                                 normal/abnormal

.

 

             HGB (test code = 12.0 g/dL    12.2-16.4    L            



             718-7)                                              

 

             HCT (test code = 36.0 %       38.4-49.3    L            



             4544-3)                                             

 

             MCV (test code = 87.0 fL      81.7-95.6                 



             787-2)                                              

 

             MCH (test code = 29.0 pg      26.1-32.7                 



             785-6)                                              

 

             MCHC (test code = 33.3 g/dL    31.2-35.0                 



             786-4)                                              

 

             RDW-SD (test code = 48.0 fL      38.5-51.6                 



             65946-4)                                            

 

             RDW-CV (test code = 15.1 %       12.1-15.4                 



             788-0)                                              

 

             PLT (test code =              See_Comment  L             [Automated



             777-3)                                              message] The sy

stem



                                                                 which generated



                                                                 this result



                                                                 transmitted



                                                                 reference range

:



                                                                 150 - 328 10*3/

?L.



                                                                 The reference r

nathan



                                                                 was not used to



                                                                 interpret this



                                                                 result as



                                                                 normal/abnormal

.

 

             MPV (test code = 13.7 fL      9.8-13.0     H            



             58613-8)                                            

 

             IPF % (test code = 15.0 %       1.2-10.7     H            Platelet 

count



             [id number])                                         measured by



                                                                 fluorescence



                                                                 method.

 

             NRBC/100 WBC (test              See_Comment                [Automat

ed



             code = 6574226479)                                        message] 

The system



                                                                 which generated



                                                                 this result



                                                                 transmitted



                                                                 reference range

:



                                                                 0.0 - 10.0 /100



                                                                 WBCs. The refer

ence



                                                                 range was not u

sed



                                                                 to interpret th

is



                                                                 result as



                                                                 normal/abnormal

.

 

             NRBC x10^3 (test code <0.01        See_Comment                [Auto

mated



             = 6142767262)                                        message] The s

ystem



                                                                 which generated



                                                                 this result



                                                                 transmitted



                                                                 reference range

:



                                                                 10*3/?L. The



                                                                 reference range

 was



                                                                 not used to



                                                                 interpret this



                                                                 result as



                                                                 normal/abnormal

.

 

             GRAN MAT (NEUT) % 66.8 %                                 



             (test code = 770-8)                                        

 

             IMM GRAN % (test code 0.40 %                                 



             = 0441766711)                                        

 

             LYMPH % (test code = 14.8 %                                 



             736-9)                                              

 

             MONO % (test code = 16.6 %                                 



             5905-5)                                             

 

             EOS % (test code = 0.8 %                                  



             713-8)                                              

 

             BASO % (test code = 0.6 %                                  



             706-2)                                              

 

             GRAN MAT x10^3(ANC) 6.00 10*3/uL 1.99-6.95                 



             (test code =                                        



             8189982015)                                         

 

             IMM GRAN x10^3 (test 0.04 10*3/uL 0.00-0.06                 



             code = 7612411726)                                        

 

             LYMPH x10^3 (test code 1.33 10*3/uL 1.09-3.23                 



             = 731-0)                                            

 

             MONO x10^3 (test code 1.49 10*3/uL 0.36-1.02    H            



             = 742-7)                                            

 

             EOS x10^3 (test code = 0.07 10*3/uL 0.06-0.53                 



             711-2)                                              

 

             BASO x10^3 (test code 0.05 10*3/uL 0.01-0.09                 



             = 704-7)                                            

 

             Lab Interpretation Abnormal                               



             (test code = 23167-6)                                        



[facility]





             Test Item    Value        Reference Range Interpretation Comments

 

             POCT GLU (test code = 4697828035) 201 mg/dL           H      

      

 

             Lab Interpretation (test code = Abnormal                           

    



             77615-1)                                            



[id number] 15[facility]54:53





             Test Item    Value        Reference Range Interpretation Comments

 

             TSH (test code =              See_Comment               Biotin has 

been



             4154849730)                                         reported to cau

se a



                                                                 negative bias,



                                                                 interpret resul

ts



                                                                 relative to pat

yuniernt's



                                                                 use of biotin.



                                                                 [Automated mess

age]



                                                                 The system [employer]



                                                                 generated this 

result



                                                                 transmitted ref

erence



                                                                 range: 0.45 - 4

.70



                                                                 mIU/L. The refe

rence



                                                                 range was not u

sed to



                                                                 interpret this 

result



                                                                 as normal/abnor

mal.

 

             Lab Interpretation (test Normal                                 



             code = 25471-5)                                        



[id number]14





             Test Item    Value        Reference Range Interpretation Comments

 

             NT-proBNP (test code 3180 pg/mL   See_Comment  H             [Autom

ated



             = 0540652923)                                        message] The



                                                                 system which



                                                                 generated this



                                                                 result



                                                                 transmitted



                                                                 reference range

:



                                                                 <=450. The



                                                                 reference range



                                                                 was not used to



                                                                 interpret this



                                                                 result as



                                                                 normal/abnormal

.

 

             RAHEL (test code = RAHEL) Biotin has been                           



                          reported to                            



                          cause a negative                           



                          bias, interpret                           



                          results relative                           



                          to patient's use                           



                          of biotin.                             

 

             Lab Interpretation Abnormal                               



             (test code = 58716-4)                                        



[facility]NA, K, CL, CO2, 
GLUCOSE, BUN, CREATININE, CA)2021 15:22:14





             Test Item    Value        Reference Range Interpretation Comments

 

             NA (test code = 136 mmol/L   135-145                   



             1883185212)                                         

 

             K (test code = 3.2 mmol/L   3.5-5.0      L            



             1775134725)                                         

 

             CL (test code = 98 mmol/L                        



             3995460477)                                         

 

             CO2 TOTAL (test code = 28 mmol/L    23-31                     



             9414500892)                                         

 

             AGAP (test code =              2-16                      



             6005966612)                                         

 

             BUN (test code = 35 mg/dL     7-23         H            



             6988095071)                                         

 

             GLUCOSE (test code = 233 mg/dL           H            



             5114463475)                                         

 

             CREATININE (test code = 1.40 mg/dL   0.60-1.25    H            



             3340936015)                                         

 

             CALCIUM (test code = 8.5 mg/dL    8.6-10.6     L            



             8154535442)                                         

 

             eGFR (test code =              mL/min/1.73m2              



             9606354715)                                         

 

             RAHEL (test code = RAHEL) Association of                           



                          Glomerular Filtration                           



                          Rate (GFR) and Staging                           



                          of Kidney Disease*                           



                          +---------------------                           



                          --+-------------------                           



                          --+-------------------                           



                          ------+| GFR                           



                          (mL/min/1.73 m2) ?|                           



                          With Kidney Damage ?|                           



                          ?Without Kidney                           



                          Damage+---------------                           



                          --------+-------------                           



                          --------+-------------                           



                          ------------+| ?>90 ?                           



                          ? ? ? ? ? ? ? ?|                           



                          ?Stage one ? ? ? ? ?|                           



                          ? Normal ? ? ? ? ? ? ?                           



                          ?+--------------------                           



                          ---+------------------                           



                          ---+------------------                           



                          -------+| ?60-89 ? ? ?                           



                          ? ? ? ? ?| ?Stage two                           



                          ? ? ? ? ?| ? Decreased                           



                          GFR ? ? ? ?                            



                          +---------------------                           



                          --+-------------------                           



                          --+-------------------                           



                          ------+| ?30-59 ? ? ?                           



                          ? ? ? ? ?| ?Stage                           



                          three ? ? ? ?| ? Stage                           



                          three ? ? ? ? ?                           



                          +---------------------                           



                          --+-------------------                           



                          --+-------------------                           



                          ------+| ?15-29 ? ? ?                           



                          ? ? ? ? ?| ?Stage four                           



                          ? ? ? ? | ? Stage four                           



                          ? ? ? ? ?                              



                          ?+--------------------                           



                          ---+------------------                           



                          ---+------------------                           



                          -------+| ?<15 (or                           



                          dialysis) ? ?| ?Stage                           



                          five ? ? ? ? | ? Stage                           



                          five ? ? ? ? ?                           



                          ?+--------------------                           



                          ---+------------------                           



                          ---+------------------                           



                          -------+ *Each stage                           



                          assumes the associated                           



                          GFR level has been in                           



                          effect for at least                           



                          three months. ?Stages                           



                          1 to 5, with or                           



                          without kidney                           



                          disease, indicate                           



                          chronic kidney                           



                          disease. Notes:                           



                          Determination of                           



                          stages one and two                           



                          (with eGFR                             



                          >59mL/min/1.73 m2)                           



                          requires estimation of                           



                          kidney damage for at                           



                          least three months as                           



                          defined by structural                           



                          or functional                           



                          abnormalities of the                           



                          kidney, manifested by                           



                          either:Pathological                           



                          abnormalities or                           



                          Markers of kidney                           



                          damage (including                           



                          abnormalities in the                           



                          composition of the                           



                          blood or urine or                           



                          abnormalities in                           



                          imaging tests).                           

 

             Lab Interpretation Abnormal                               



             (test code = 62759-7)                                        



[facility] GLUCOSE (AUTOMATED)2021 13:32:20





             Test Item    Value        Reference Range Interpretation Comments

 

             POCT GLU (test code = 9699022179) 119 mg/dL           H      

      

 

             Lab Interpretation (test code = Abnormal                           

    



             30681-4)                                            



[facility] GLUCOSE (AUTOMATED)2021 08:59:26





             Test Item    Value        Reference Range Interpretation Comments

 

             POCT GLU (test code = 2797283899) 146 mg/dL           H      

      

 

             Lab Interpretation (test code = Abnormal                           

    



             59920-0)                                            



[facility] GLUCOSE (AUTOMATED)2021 23:03:25





             Test Item    Value        Reference Range Interpretation Comments

 

             POCT GLU (test code = 0929996691) 171 mg/dL           H      

      

 

             Lab Interpretation (test code = Abnormal                           

    



             39098-0)                                            



[facility] GLUCOSE (AUTOMATED)2021 18:36:02





             Test Item    Value        Reference Range Interpretation Comments

 

             POCT GLU (test code = 1324434833) 170 mg/dL           H      

      

 

             Lab Interpretation (test code = Abnormal                           

    



             20661-6)                                            



[facility]28





             Test Item    Value        Reference Range Interpretation Comments

 

             POCT GLU (test code = 8612778871) 139 mg/dL           H      

      

 

             Lab Interpretation (test code = Abnormal                           

    



             98173-3)                                            



[facility]





             Test Item    Value        Reference    Interpretation Comments



                                       Range                     

 

             TROPONIN I (test 0.046 ng/mL  See_Comment  H             [Automated



             code = 1689450042)                                        message] 

The



                                                                 system which



                                                                 generated this



                                                                 result



                                                                 transmitted



                                                                 reference range

:



                                                                 <=0.034. The



                                                                 reference range



                                                                 was not used to



                                                                 interpret this



                                                                 result as



                                                                 normal/abnormal

.

 

             RAHEL (test code = Reference (Normal)                           



             RAHEL)         Range (defined by                           



                          the 99th percentile                           



                          reference limit): <=                           



                          0.034 ng/mL Note:                           



                          Cardiac troponin                           



                          begins to rise 3-4                           



                          hours after the                           



                          onset of ischemia.                           



                          Repeat in 4-6 hours                           



                          if the sample was                           



                          drawn within 3-4                           



                          hours of the onset                           



                          of the symptom and                           



                          found normal.                           



                          Diagnosis of                           



                          myocardial injury is                           



                          made with acute                           



                          changes in cTn                           



                          concentrations with                           



                          at least one serial                           



                          sample above the                           



                          99th percentile                           



                          upper reference                           



                          limit (URL), taken                           



                          together with the                           



                          patient's clinical                           



                          presentation. Biotin                           



                          has been reported to                           



                          cause a negative                           



                          bias, interpret                           



                          results relative to                           



                          patient's use of                           



                          biotin.                                

 

             Lab Interpretation Abnormal                               



             (test code =                                        



             55243-0)                                            



[id number]





             Test Item    Value        Reference Range Interpretation Comments

 

             NT-proBNP (test code 7650 pg/mL   See_Comment  H             [Autom

ated



             = [id number])                                        message] The



                                                                 system which



                                                                 generated this



                                                                 result



                                                                 transmitted



                                                                 reference range

:



                                                                 <=450. The



                                                                 reference range



                                                                 was not used to



                                                                 interpret this



                                                                 result as



                                                                 normal/abnormal

.

 

             RAHEL (test code = RAHEL) Biotin has been                           



                          reported to                            



                          cause a negative                           



                          bias, interpret                           



                          results relative                           



                          to patient's use                           



                          of biotin.                             

 

             Lab Interpretation Abnormal                               



             (test code = 19331-3)                                        



[id number]06





             Test Item    Value        Reference Range Interpretation Comments

 

             WBC (test code =              See_Comment                [Automated



             6690-2)                                             message] The sy

stem



                                                                 which generated



                                                                 this result



                                                                 transmitted



                                                                 reference range

:



                                                                 4.20 - 10.70



                                                                 10*3/?L. The



                                                                 reference range

 was



                                                                 not used to



                                                                 interpret this



                                                                 result as



                                                                 normal/abnormal

.

 

             RBC (test code =              See_Comment                [Automated



             789-8)                                              message] The sy

stem



                                                                 which generated



                                                                 this result



                                                                 transmitted



                                                                 reference range

:



                                                                 4.26 - 5.52



                                                                 10*6/?L. The



                                                                 reference range

 was



                                                                 not used to



                                                                 interpret this



                                                                 result as



                                                                 normal/abnormal

.

 

             HGB (test code = 13.3 g/dL    12.2-16.4                 



             718-7)                                              

 

             HCT (test code = 41.0 %       38.4-49.3                 



             4544-3)                                             

 

             MCV (test code = 88.4 fL      81.7-95.6                 



             787-2)                                              

 

             MCH (test code = 28.7 pg      26.1-32.7                 



             785-6)                                              

 

             MCHC (test code = 32.4 g/dL    31.2-35.0                 



             786-4)                                              

 

             RDW-SD (test code = 50.0 fL      38.5-51.6                 



             10306-4)                                            

 

             RDW-CV (test code = 15.4 %       12.1-15.4                 



             788-0)                                              

 

             PLT (test code =              See_Comment  L             [Automated



             777-3)                                              message] The sy

stem



                                                                 which generated



                                                                 this result



                                                                 transmitted



                                                                 reference range

:



                                                                 150 - 328 10*3/

?L.



                                                                 The reference r

nathan



                                                                 was not used to



                                                                 interpret this



                                                                 result as



                                                                 normal/abnormal

.

 

             MPV (test code = 12.9 fL      9.8-13.0                  



             39849-5)                                            

 

             NRBC/100 WBC (test              See_Comment                [Automat

ed



             code = 5938569649)                                        message] 

The system



                                                                 which generated



                                                                 this result



                                                                 transmitted



                                                                 reference range

:



                                                                 0.0 - 10.0 /100



                                                                 WBCs. The refer

ence



                                                                 range was not u

sed



                                                                 to interpret th

is



                                                                 result as



                                                                 normal/abnormal

.

 

             NRBC x10^3 (test code <0.01        See_Comment                [Auto

mated



             = 7055809445)                                        message] The s

ystem



                                                                 which generated



                                                                 this result



                                                                 transmitted



                                                                 reference range

:



                                                                 10*3/?L. The



                                                                 reference range

 was



                                                                 not used to



                                                                 interpret this



                                                                 result as



                                                                 normal/abnormal

.

 

             GRAN MAT (NEUT) % 70.5 %                                 



             (test code = 770-8)                                        

 

             IMM GRAN % (test code 0.70 %                                 



             = 0202242239)                                        

 

             LYMPH % (test code = 10.2 %                                 



             736-9)                                              

 

             MONO % (test code = 16.2 %                                 



             5905-5)                                             

 

             EOS % (test code = 1.7 %                                  



             713-8)                                              

 

             BASO % (test code = 0.7 %                                  



             706-2)                                              

 

             GRAN MAT x10^3(ANC) 6.39 10*3/uL 1.99-6.95                 



             (test code =                                        



             7920828158)                                         

 

             IMM GRAN x10^3 (test 0.06 10*3/uL 0.00-0.06                 



             code = 2257267493)                                        

 

             LYMPH x10^3 (test code 0.92 10*3/uL 1.09-3.23    L            



             = 731-0)                                            

 

             MONO x10^3 (test code 1.47 10*3/uL 0.36-1.02    H            



             = 742-7)                                            

 

             EOS x10^3 (test code = 0.15 10*3/uL 0.06-0.53                 



             711-2)                                              

 

             BASO x10^3 (test code 0.06 10*3/uL 0.01-0.09                 



             = 704-7)                                            

 

             Lab Interpretation Abnormal                               



             (test code = 76444-2)                                        



[facility] Metabolic Panel (NA, K, CL, CO2, 
GLUCOSE, BUN, CREATININE, CA)2021 09:59:05





             Test Item    Value        Reference Range Interpretation Comments

 

             NA (test code = 139 mmol/L   135-145                   



             7214081630)                                         

 

             K (test code = 3.3 mmol/L   3.5-5.0      L            



             8853339624)                                         

 

             CL (test code = 101 mmol/L                       



             9186810075)                                         

 

             CO2 TOTAL (test code = 26 mmol/L    23-31                     



             5991358129)                                         

 

             AGAP (test code =              2-16                      



             3127397060)                                         

 

             BUN (test code = 33 mg/dL     7-23         H            



             2039715962)                                         

 

             GLUCOSE (test code = 146 mg/dL           H            



             5060688707)                                         

 

             CREATININE (test code = 1.23 mg/dL   0.60-1.25                 



             4707568250)                                         

 

             CALCIUM (test code = 9.4 mg/dL    8.6-10.6                  



             8195430524)                                         

 

             eGFR (test code =              mL/min/1.73m2              



             7881720711)                                         

 

             RAHEL (test code = RAHEL) Association of                           



                          Glomerular Filtration                           



                          Rate (GFR) and Staging                           



                          of Kidney Disease*                           



                          +---------------------                           



                          --+-------------------                           



                          --+-------------------                           



                          ------+| GFR                           



                          (mL/min/1.73 m2) ?|                           



                          With Kidney Damage ?|                           



                          ?Without Kidney                           



                          Damage+---------------                           



                          --------+-------------                           



                          --------+-------------                           



                          ------------+| ?>90 ?                           



                          ? ? ? ? ? ? ? ?|                           



                          ?Stage one ? ? ? ? ?|                           



                          ? Normal ? ? ? ? ? ? ?                           



                          ?+--------------------                           



                          ---+------------------                           



                          ---+------------------                           



                          -------+| ?60-89 ? ? ?                           



                          ? ? ? ? ?| ?Stage two                           



                          ? ? ? ? ?| ? Decreased                           



                          GFR ? ? ? ?                            



                          +---------------------                           



                          --+-------------------                           



                          --+-------------------                           



                          ------+| ?30-59 ? ? ?                           



                          ? ? ? ? ?| ?Stage                           



                          three ? ? ? ?| ? Stage                           



                          three ? ? ? ? ?                           



                          +---------------------                           



                          --+-------------------                           



                          --+-------------------                           



                          ------+| ?15-29 ? ? ?                           



                          ? ? ? ? ?| ?Stage four                           



                          ? ? ? ? | ? Stage four                           



                          ? ? ? ? ?                              



                          ?+--------------------                           



                          ---+------------------                           



                          ---+------------------                           



                          -------+| ?<15 (or                           



                          dialysis) ? ?| ?Stage                           



                          five ? ? ? ? | ? Stage                           



                          five ? ? ? ? ?                           



                          ?+--------------------                           



                          ---+------------------                           



                          ---+------------------                           



                          -------+ *Each stage                           



                          assumes the associated                           



                          GFR level has been in                           



                          effect for at least                           



                          three months. ?Stages                           



                          1 to 5, with or                           



                          without kidney                           



                          disease, indicate                           



                          chronic kidney                           



                          disease. Notes:                           



                          Determination of                           



                          stages one and two                           



                          (with eGFR                             



                          >59mL/min/1.73 m2)                           



                          requires estimation of                           



                          kidney damage for at                           



                          least three months as                           



                          defined by structural                           



                          or functional                           



                          abnormalities of the                           



                          kidney, manifested by                           



                          either:Pathological                           



                          abnormalities or                           



                          Markers of kidney                           



                          damage (including                           



                          abnormalities in the                           



                          composition of the                           



                          blood or urine or                           



                          abnormalities in                           



                          imaging tests).                           

 

             Lab Interpretation Abnormal                               



             (test code = 87853-7)                                        



[facility]





             Test Item    Value        Reference Range Interpretation Comments

 

             MAGNESIUM (test code = 8790945652) 1.4 mg/dL    1.7-2.4      L     

       

 

             Lab Interpretation (test code = Abnormal                           

    



             98546-1)                                            



[facility] GLUCOSE (AUTOMATED)2021 00:49:23





             Test Item    Value        Reference Range Interpretation Comments

 

             POCT GLU (test code = 8968590037) 168 mg/dL           H      

      

 

             Lab Interpretation (test code = Abnormal                           

    



             34987-4)                                            



[facility] GLUCOSE (AUTOMATED)2021 21:41:47





             Test Item    Value        Reference Range Interpretation Comments

 

             POCT GLU (test code = 9319612510) 185 mg/dL           H      

      

 

             Lab Interpretation (test code = Abnormal                           

    



             25802-9)                                            



[facility] 
21:27:06





             Test Item    Value        Reference Range Interpretation Comments

 

             HGB A1C (test code = 6.6 %        4.0-5.7      H            



             4548-4)                                             

 

             RAHEL (test code = RAHEL) Reference                              



                          RangesNormal:                           



                          <5.7%Prediabetes:                           



                          5.7 - 6.4%Diabetes:                           



                          > 6.5%                                 

 

             Lab Interpretation (test Abnormal                               



             code = 80170-7)                                        



[facility] 16[facility]53:40





             Test Item    Value        Reference Range Interpretation Comments

 

             POCT GLU (test code = 9338743851) 162 mg/dL           H      

      

 

             Lab Interpretation (test code = Abnormal                           

    



             53961-5)                                            



[id number] 14:08:48





             Test Item    Value        Reference Range Interpretation Comments

 

             TSH (test code =              See_Comment                [Automated

 message]



             9669206340)                                         The system [employer]



                                                                 generated this 

result



                                                                 transmitted ref

erence



                                                                 range: 0.45 - 4

.70



                                                                 mIU/L. The refe

rence



                                                                 range was not u

sed to



                                                                 interpret this 

result



                                                                 as normal/abnor

mal.

 

             Lab Interpretation (test Normal                                 



             code = 48202-7)                                        



[facility]





             Test Item    Value        Reference    Interpretation Comments



                                       Range                     

 

             TROPONIN I (test 0.082 ng/mL  See_Comment  H             [Automated



             code = 4785738228)                                        message] 

The



                                                                 system which



                                                                 generated this



                                                                 result



                                                                 transmitted



                                                                 reference range

:



                                                                 <=0.034. The



                                                                 reference range



                                                                 was not used to



                                                                 interpret this



                                                                 result as



                                                                 normal/abnormal

.

 

             RAHEL (test code = Reference (Normal)                           



             RAHEL)         Range (defined by                           



                          the 99th percentile                           



                          reference limit): <=                           



                          0.034 ng/mL Note:                           



                          Cardiac troponin                           



                          begins to rise 3-4                           



                          hours after the                           



                          onset of ischemia.                           



                          Repeat in 4-6 hours                           



                          if the sample was                           



                          drawn within 3-4                           



                          hours of the onset                           



                          of the symptom and                           



                          found normal.                           



                          Diagnosis of                           



                          myocardial injury is                           



                          made with acute                           



                          changes in cTn                           



                          concentrations with                           



                          at least one serial                           



                          sample above the                           



                          99th percentile                           



                          upper reference                           



                          limit (URL), taken                           



                          together with the                           



                          patient's clinical                           



                          presentation. Biotin                           



                          has been reported to                           



                          cause a negative                           



                          bias, interpret                           



                          results relative to                           



                          patient's use of                           



                          biotin.                                

 

             Lab Interpretation Abnormal                               



             (test code =                                        



             48874-8)                                            



[id number] CHOLESTEROL, 
TRIGLYCERIDES, HDL)2021 13:22:37





             Test Item    Value        Reference Range Interpretation Comments

 

             CHOL (test code = 104 mg/dL    120-200      L            



             6524605152)                                         

 

             HDL (test code = 32 mg/dL     >40          L            



             8028471773)                                         

 

             HDLC RATIO (test code =              See_Comment                [Au

tomated message]



             8461753548)                                         The system [employer]



                                                                 generated this



                                                                 result transmit

madelyn



                                                                 reference range

:



                                                                 <=5.0. The refe

rence



                                                                 range was not u

sed



                                                                 to interpret th

is



                                                                 result as



                                                                 normal/abnormal

.

 

             TRIG (test code = 108 mg/dL                        



             3025160841)                                         

 

             LDL CHOL (test code = 50 mg/dL     See_Comment                [Auto

mated message]



             35551-8)                                            The system [employer]



                                                                 generated this



                                                                 result transmit

madelyn



                                                                 reference range

:



                                                                 <=160. The refe

rence



                                                                 range was not u

sed



                                                                 to interpret th

is



                                                                 result as



                                                                 normal/abnormal

.

 

             VLDL (test code = 22 mg/dL     5-60                      



             5368047598)                                         

 

             Lab Interpretation (test Abnormal                               



             code = 00815-7)                                        



[facility] [id number] 12:40:36 Cardiomegaly 
with mild pulmonary edema. Preliminary Report Dictated by Resident: Mohamed Ibrahim Aldesoky RiadElshikh I, Samuel ?Bezold, MD., have reviewed this study 
and agree with the abovereport.EXAM: XR CHEST 1 VW COMPARISON: None. HISTORY: 
SOB, Chest Pain FINDINGS: Lungs: The bronchovascular markings are prominent and 
cephalized. Bilaterallower lobes atelectasis seen No pleural effusion or pneum
othorax isidentified. Heart/Mediastinum: The cardiomediastinal silhouette is 
enlarged. Bones: No osseous lesions are detected. The soft tissues appear normal
[facility], Radiant Results Inft User - 2021 7:41 AM CDTFormatting of this note 
might be different from the original.EXAM: XR CHEST 1 VWCOMPARISON: 
None.HISTORY: SOB, Chest Pain FINDINGS:Lungs: The bronchovascular markings are 
prominent and cephalized. Bilaterallower lobes atelectasis seen No pleural 
effusion or pneumothorax isidentified. Heart/Mediastinum: The cardiomediastinal 
silhouette is enlarged.Bones: No osseous lesions are detected. The soft tissues 
appear normalIMPRESSIONCardiomegaly with mild pulmonary edema.Preliminary Report
Dictated by Resident: Mohamed Lambert Aldesoky RiadElshikhI, Jayson Bezold, MD.,
have reviewed this study and agree with the abovereport.[facility]





             Test Item    Value        Reference    Interpretation Comments



                                       Range                     

 

             TROPONIN I (test 0.078 ng/mL  See_Comment  H             [Automated



             code = 0332608529)                                        message] 

The



                                                                 system which



                                                                 generated this



                                                                 result



                                                                 transmitted



                                                                 reference range

:



                                                                 <=0.034. The



                                                                 reference range



                                                                 was not used to



                                                                 interpret this



                                                                 result as



                                                                 normal/abnormal

.

 

             RAHEL (test code = Reference (Normal)                           



             RAHEL)         Range (defined by                           



                          the 99th percentile                           



                          reference limit): <=                           



                          0.034 ng/mL Note:                           



                          Cardiac troponin                           



                          begins to rise 3-4                           



                          hours after the                           



                          onset of ischemia.                           



                          Repeat in 4-6 hours                           



                          if the sample was                           



                          drawn within 3-4                           



                          hours of the onset                           



                          of the symptom and                           



                          found normal.                           



                          Diagnosis of                           



                          myocardial injury is                           



                          made with acute                           



                          changes in cTn                           



                          concentrations with                           



                          at least one serial                           



                          sample above the                           



                          99th percentile                           



                          upper reference                           



                          limit (URL), taken                           



                          together with the                           



                          patient's clinical                           



                          presentation. Biotin                           



                          has been reported to                           



                          cause a negative                           



                          bias, interpret                           



                          results relative to                           



                          patient's use of                           



                          biotin.                                

 

             Lab Interpretation Abnormal                               



             (test code =                                        



             35453-1)                                            



[id number] 05:00:46





             Test Item    Value        Reference Range Interpretation Comments

 

             NT-proBNP (test code 12927 pg/mL  See_Comment  H             [Autom

ated



             = [id number])                                        message] The



                                                                 system which



                                                                 generated this



                                                                 result



                                                                 transmitted



                                                                 reference range

:



                                                                 <=450. The



                                                                 reference range



                                                                 was not used to



                                                                 interpret this



                                                                 result as



                                                                 normal/abnormal

.

 

             RAHEL (test code = RAHEL) Biotin has been                           



                          reported to                            



                          cause a negative                           



                          bias, interpret                           



                          results relative                           



                          to patient's use                           



                          of biotin.                             

 

             Lab Interpretation Abnormal                               



             (test code = 44336-9)                                        



[facility] 
04:55:06





             Test Item    Value        Reference Range Interpretation Comments

 

             SARS-CoV-2 Rapid ID NOW Not Detected Not Detected              



             (test code = 97181-2)                                        

 

             RAHEL (test code = RAHEL) ID NOW COVID-19 Assay                        

   



                          is an isothermal                           



                          nucleic acid                           



                          amplification test                           



                          intended for the                           



                          qualitative detection                           



                          of nucleic acid from                           



                          SARS-CoV-2 viral RNA                           



                          in nasopharyngeal (NP)                           



                          specimens. It is used                           



                          under Emergency Use                           



                          Authorization (EUA) by                           



                          FDA. The limit of                           



                          detection (LOD) of the                           



                          assay is 125 Genome                           



                          Equivalents/mL. A                           



                          positive result is                           



                          indicative of the                           



                          presence of SARS-CoV-2                           



                          RNA. ?Clinical                           



                          correlation with                           



                          patient history and                           



                          other diagnostic                           



                          information is                           



                          necessary to determine                           



                          patient infection                           



                          status. A negative                           



                          (Not Detected) result                           



                          does not preclude                           



                          SARS-CoV-2 infection.                           



                          In patients with                           



                          clinical symptoms and                           



                          other tests that are                           



                          consistent with                           



                          SARS-CoV-2 infection,                           



                          negative results                           



                          should be treated as                           



                          presumptive negative                           



                          and a new specimen                           



                          should be tested with                           



                          alternative PCR                           



                          molecular test.                           



                          Invalid: Please                           



                          collect a new specimen                           



                          for repeat patient                           



                          testing if clinically                           



                          indicated.                             

 

             Lab Interpretation Normal                                 



             (test code = 58203-5)                                        



[facility]43





             Test Item    Value        Reference Range Interpretation Comments

 

             MAGNESIUM (test code = 3067281252) 1.5 mg/dL    1.7-2.4      L     

       

 

             Lab Interpretation (test code = Abnormal                           

    



             86775-0)                                            



[facility]. METABOLIC PANEL (97983)2021 
04:53:23





             Test Item    Value        Reference Range Interpretation Comments

 

             NA (test code = 142 mmol/L   135-145                   



             8175838166)                                         

 

             K (test code = 4.6 mmol/L   3.5-5.0                   



             0221359924)                                         

 

             CL (test code = 109 mmol/L          H            



             1470920699)                                         

 

             CO2 TOTAL (test code = 20 mmol/L    23-31        L            



             9938979431)                                         

 

             AGAP (test code =              2-16                      



             2868876751)                                         

 

             BUN (test code = 33 mg/dL     7-23         H            



             2582452487)                                         

 

             GLUCOSE (test code = 191 mg/dL           H            



             7997102659)                                         

 

             CREATININE (test code = 1.40 mg/dL   0.60-1.25    H            



             1575116442)                                         

 

             TOTAL BILI (test code = 1.4 mg/dL    0.1-1.1      H            



             7195864103)                                         

 

             CALCIUM (test code = 9.5 mg/dL    8.6-10.6                  



             5288606253)                                         

 

             T PROTEIN (test code = 8.1 g/dL     6.3-8.2                   



             5838591521)                                         

 

             ALBUMIN (test code = 4.5 g/dL     3.5-5.0                   



             6082191847)                                         

 

             ALK PHOS (test code = 105 U/L                          



             9626043609)                                         

 

             ALTv (test code = 31 U/L       5-50                      



             1742-6)                                             

 

             AST(SGOT) (test code = 41 U/L       13-40        H            



             7681908350)                                         

 

             eGFR (test code =              mL/min/1.73m2              



             0467657461)                                         

 

             RAHEL (test code = RAHEL) Association of                           



                          Glomerular Filtration                           



                          Rate (GFR) and Staging                           



                          of Kidney Disease*                           



                          +---------------------                           



                          --+-------------------                           



                          --+-------------------                           



                          ------+| GFR                           



                          (mL/min/1.73 m2) ?|                           



                          With Kidney Damage ?|                           



                          ?Without Kidney                           



                          Damage+---------------                           



                          --------+-------------                           



                          --------+-------------                           



                          ------------+| ?>90 ?                           



                          ? ? ? ? ? ? ? ?|                           



                          ?Stage one ? ? ? ? ?|                           



                          ? Normal ? ? ? ? ? ? ?                           



                          ?+--------------------                           



                          ---+------------------                           



                          ---+------------------                           



                          -------+| ?60-89 ? ? ?                           



                          ? ? ? ? ?| ?Stage two                           



                          ? ? ? ? ?| ? Decreased                           



                          GFR ? ? ? ?                            



                          +---------------------                           



                          --+-------------------                           



                          --+-------------------                           



                          ------+| ?30-59 ? ? ?                           



                          ? ? ? ? ?| ?Stage                           



                          three ? ? ? ?| ? Stage                           



                          three ? ? ? ? ?                           



                          +---------------------                           



                          --+-------------------                           



                          --+-------------------                           



                          ------+| ?15-29 ? ? ?                           



                          ? ? ? ? ?| ?Stage four                           



                          ? ? ? ? | ? Stage four                           



                          ? ? ? ? ?                              



                          ?+--------------------                           



                          ---+------------------                           



                          ---+------------------                           



                          -------+| ?<15 (or                           



                          dialysis) ? ?| ?Stage                           



                          five ? ? ? ? | ? Stage                           



                          five ? ? ? ? ?                           



                          ?+--------------------                           



                          ---+------------------                           



                          ---+------------------                           



                          -------+ *Each stage                           



                          assumes the associated                           



                          GFR level has been in                           



                          effect for at least                           



                          three months. ?Stages                           



                          1 to 5, with or                           



                          without kidney                           



                          disease, indicate                           



                          chronic kidney                           



                          disease. Notes:                           



                          Determination of                           



                          stages one and two                           



                          (with eGFR                             



                          >59mL/min/1.73 m2)                           



                          requires estimation of                           



                          kidney damage for at                           



                          least three months as                           



                          defined by structural                           



                          or functional                           



                          abnormalities of the                           



                          kidney, manifested by                           



                          either:Pathological                           



                          abnormalities or                           



                          Markers of kidney                           



                          damage (including                           



                          abnormalities in the                           



                          composition of the                           



                          blood or urine or                           



                          abnormalities in                           



                          imaging tests).                           

 

             Lab Interpretation Abnormal                               



             (test code = 19787-7)                                        



[facility] WITH [id number] 04:37:40





             Test Item    Value        Reference Range Interpretation Comments

 

             WBC (test code =              See_Comment                [Automated



             8315-2)                                             message] The sy

stem



                                                                 which generated



                                                                 this result



                                                                 transmitted



                                                                 reference range

:



                                                                 4.20 - 10.70



                                                                 10*3/?L. The



                                                                 reference range

 was



                                                                 not used to



                                                                 interpret this



                                                                 result as



                                                                 normal/abnormal

.

 

             RBC (test code =              See_Comment                [Automated



             389-8)                                              message] The sy

stem



                                                                 which generated



                                                                 this result



                                                                 transmitted



                                                                 reference range

:



                                                                 4.26 - 5.52



                                                                 10*6/?L. The



                                                                 reference range

 was



                                                                 not used to



                                                                 interpret this



                                                                 result as



                                                                 normal/abnormal

.

 

             HGB (test code = 14.1 g/dL    12.2-16.4                 



             718-7)                                              

 

             HCT (test code = 43.9 %       38.4-49.3                 



             4544-3)                                             

 

             MCV (test code = 90.1 fL      81.7-95.6                 



             787-2)                                              

 

             MCH (test code = 29.0 pg      26.1-32.7                 



             785-6)                                              

 

             MCHC (test code = 32.1 g/dL    31.2-35.0                 



             786-4)                                              

 

             RDW-SD (test code = 52.0 fL      38.5-51.6    H            



             80503-7)                                            

 

             RDW-CV (test code = 15.9 %       12.1-15.4    H            



             788-0)                                              

 

             PLT (test code =              See_Comment                [Automated



             777-3)                                              message] The sy

stem



                                                                 which generated



                                                                 this result



                                                                 transmitted



                                                                 reference range

:



                                                                 150 - 328 10*3/

?L.



                                                                 The reference r

nathan



                                                                 was not used to



                                                                 interpret this



                                                                 result as



                                                                 normal/abnormal

.

 

             MPV (test code = 13.0 fL      9.8-13.0                  



             75098-2)                                            

 

             NRBC/100 WBC (test              See_Comment                [Automat

ed



             code = [id number])                                        message] 

The system



                                                                 which generated



                                                                 this result



                                                                 transmitted



                                                                 reference range

:



                                                                 0.0 - 10.0 /100



                                                                 WBCs. The refer

ence



                                                                 range was not u

sed



                                                                 to interpret th

is



                                                                 result as



                                                                 normal/abnormal

.

 

             NRBC x10^3 (test code <0.01        See_Comment                [Auto

mated



             = [id number])                                        message] The s

ystem



                                                                 which generated



                                                                 this result



                                                                 transmitted



                                                                 reference range

:



                                                                 10*3/?L. The



                                                                 reference range

 was



                                                                 not used to



                                                                 interpret this



                                                                 result as



                                                                 normal/abnormal

.

 

             GRAN MAT (NEUT) % 75.9 %                                 



             (test code = 770-8)                                        

 

             IMM GRAN % (test code 0.50 %                                 



             = [id number])                                        

 

             LYMPH % (test code = 10.9 %                                 



             736-9)                                              

 

             MONO % (test code = 11.7 %                                 



             5905-5)                                             

 

             EOS % (test code = 0.2 %                                  



             713-8)                                              

 

             BASO % (test code = 0.8 %                                  



             706-2)                                              

 

             GRAN MAT x10^3(ANC) 7.16 10*3/uL 1.99-6.95    H            



             (test code =                                        



             [id number])                                         

 

             IMM GRAN x10^3 (test 0.05 10*3/uL 0.00-0.06                 



             code = [id number])                                        

 

             LYMPH x10^3 (test code 1.03 10*3/uL 1.09-3.23    L            



             = 731-0)                                            

 

             MONO x10^3 (test code 1.11 10*3/uL 0.36-1.02    H            



             = 742-7)                                            

 

             EOS x10^3 (test code = <0.03        0.06-0.53    L            



             711-2)                                              

 

             BASO x10^3 (test code 0.08 10*3/uL 0.01-0.09                 



             = 704-7)                                            

 

             Lab Interpretation Abnormal                               



             (test code = 58295-8)                                        



[facility]

## 2022-11-29 NOTE — RAD REPORT
EXAM DESCRIPTION:  RAD - Chest Single View - 11/29/2022 4:02 pm

 

CLINICAL HISTORY:  CONGESTION

Chest pain.

 

COMPARISON:  CHEST PA AND LAT 2 VIEW dated 7/15/2011; CHEST PA AND LAT 2 VIEW dated 9/3/2009

 

FINDINGS:  Portable technique limits examination quality.

 

Moderate bilateral pulmonary opacities are present which may represent pulmonary edema or pneumonia. 
The heart is moderately enlarged in size. No displaced fractures.Small left pleural effusion.

 

IMPRESSION:  Moderate CHF suspected.

## 2022-11-29 NOTE — ER
Nurse's Notes                                                                                     

 [facility]                                                                 

Name: Lucas Reyes Jr                                                                              

Age: 77 yrs                                                                                       

Sex: Male                                                                                         

: 1945                                                                                   

MRN: [id number]                                                                                   

Arrival Date: 2022                                                                          

Time: 14:56                                                                                       

Account#: [id number]                                                                            

Bed 7                                                                                             

Private MD:                                                                                       

Diagnosis: Unspecified atrial fibrillation-with RVR;Unspecified combined systolic (congestive) and

  diastolic (congestive) heart failure                                                            

                                                                                                  

Presentation:                                                                                     

                                                                                             

15:09 Chief complaint: Patient states: Reporting SOB \T\ fast heart rate - states "I woke up    ld1

      this morning feeling like this." Denies chest pain. Coronavirus screen: At this time,       

      the client does not indicate any symptoms associated with coronavirus-19. Ebola Screen:     

      (+) Ebola Screening. Initial Sepsis Screen: Does the patient meet any 2 criteria? No.       

      Patient's initial sepsis screen is negative. Does the patient have a suspected source       

      of infection? No. Patient's initial sepsis screen is negative. Risk Assessment: Do you      

      want to hurt yourself or someone else? Patient reports no desire to harm self or            

      others. Onset of symptoms was 2022.                                            

15:09 Method Of Arrival: Wheelchair                                                           ld1 

15:09 Acuity: WINDY 2                                                                           ld1 

                                                                                                  

Triage Assessment:                                                                                

15:11 General: Appears in no apparent distress. uncomfortable, Behavior is calm, cooperative, ld1 

      appropriate for age. Pain: Denies pain. EENT: No signs and/or symptoms were reported        

      regarding the EENT system. Neuro: Level of Consciousness is awake, alert, obeys             

      commands, Oriented to person, place, time, situation, Appropriate for age.                  

      Cardiovascular: Capillary refill < 3 seconds Patient's skin is warm and dry. Rhythm is      

      sinus tachycardia. Cardiovascular: Rhythm is SVT. Respiratory: Reports shortness of         

      breath at rest Airway is patent Respiratory effort is even, labored, Onset: The             

      symptoms/episode began/occurred this morning, the patient has severe shortness of           

      breath. GI: Abdomen is flat, non-distended. : No signs and/or symptoms were reported      

      regarding the genitourinary system. Derm: No signs and/or symptoms reported regarding       

      the dermatologic system. Musculoskeletal: No signs and/or symptoms reported regarding       

      the musculoskeletal system.                                                                 

                                                                                                  

Historical:                                                                                       

- Allergies:                                                                                      

15:11 No Known Allergies;                                                                     ld1 

- Home Meds:                                                                                      

15:11 apixaban 5 mg oral tab 1 tab 2 times per day [Active]; atorvastatin 20 mg oral tab 1    ld1 

      tab once daily [Active]; cyanocobalamin (vitamin B-12) 1,000 mcg oral cap [Active];         

      furosemide 40 mg Oral tab 1 tab once daily [Active]; lansoprazole 30 mg oral cpDR 1 cap     

      once daily [Active]; metformin 500 mg Oral Tb24 2 tabs once daily [Active]; thiamine        

      HCl (vitamin B1) 100 mg Oral tab [Active]; omeprazole 20 mg Oral cpDR 2 caps 2 times        

      per day [Active];                                                                           

- PMHx:                                                                                           

15:11 Hypertensive disorder; Hypercholesterolemia; Diabetes mellitus; Stricture of esophagus; ld1 

      Anemia; Heart disease;                                                                      

- PSHx:                                                                                           

15:11 left hip sx;                                                                            ld1 

                                                                                                  

- Immunization history:: Adult Immunizations up to date, Client reports receiving the             

  2nd dose of the Covid vaccine, Adult Immunizations.                                             

- Social history:: Smoking status: Patient denies any tobacco usage or history of.                

  Patient/guardian denies using alcohol, Smoking status: .                                        

                                                                                                  

                                                                                                  

Screening:                                                                                        

15:14 Abuse screen: Denies threats or abuse. Denies injuries from another. Nutritional        ph  

      screening: No deficits noted. Nutritional screening: No deficits noted. Tuberculosis        

      screening: No symptoms or risk factors identified. Fall Risk None identified.               

                                                                                                  

Assessment:                                                                                       

15:24 Reassessment: pt oxygen saturation dropped to 86% on room air. Nasal cannula placed on  mb9 

      3 l/min via nasal cannula. Oxygen saturation staying at 96%.                                

15:25 General: Appears uncomfortable, Behavior is cooperative, appropriate for age, anxious.  mb9 

      Pain: Denies pain. Neuro: Flores Agitation-Sedation Scale (RASS): 0 - Alert and Calm      

      Level of Consciousness is awake, alert, obeys commands, Oriented to person, place,          

      time, situation, Appropriate for age. Cardiovascular: Denies chest pain, Heart tones S1     

      S2 present Rhythm is atrial fibrillation with rapid ventricular response.                   

      Cardiovascular: Reports palpitations. Respiratory: Reports shortness of breath.             

      Respiratory: Reports Airway is patent Respiratory effort is even, Respiratory pattern       

      is tachypnea Breath sounds are clear bilaterally. GI: PEG tube in place, clamped. Bowel     

      sounds present X 4 quads. Abd is soft and non tender X 4 quads. : No signs and/or         

      symptoms were reported regarding the genitourinary system. EENT: No signs and/or            

      symptoms were reported regarding the EENT system. Derm: Skin is fragile, is thin, Skin      

      is dry, Skin is normal, Skin temperature is cool. Musculoskeletal: Range of motion:         

      intact in all extremities.                                                                  

15:59 General: Appears in no apparent distress. comfortable, Behavior is calm, cooperative,   mb9 

      appropriate for age. Pain: Denies pain. Neuro: Level of Consciousness is awake, alert,      

      obeys commands, Oriented to person, place, time, situation, Appropriate for age.            

      Cardiovascular: Denies chest pain, Rhythm is atrial fibrillation. Respiratory: Airway       

      is patent Respiratory pattern is tachypnea Denies shortness of breath. Derm: Skin is        

      dry, Skin is normal, Skin temperature is warm.                                              

16:54 General: Appears comfortable, Behavior is calm, cooperative, appropriate for age. Pain: mb9 

      Denies pain. Neuro: Level of Consciousness is awake, alert, obeys commands, Oriented to     

      person, place, time, situation, Appropriate for age. Cardiovascular: Rhythm is atrial       

      fibrillation. Respiratory: Airway is patent Denies shortness of breath. Derm: Skin is       

      dry, Skin is normal, Skin temperature is warm.                                              

17:44 Reassessment: Patient denies pain at this time. Reassessment: Patient states feeling    mb9 

      better. Patient states symptoms have improved.                                              

19:52 General: Appears in no apparent distress. comfortable, Behavior is calm, cooperative,   aa9 

      appropriate for age. Pain: Denies pain. Neuro: Level of Consciousness is awake, alert,      

      obeys commands, Oriented to person, place, time, situation. Respiratory: Airway is          

      patent Respiratory effort is even, Respiratory pattern is tachypnea. GI: PEG tube in        

      place, clamped. : No signs and/or symptoms were reported regarding the genitourinary      

      system. Derm: Skin is intact, with poor turgor. Musculoskeletal: Range of motion:           

      intact in all extremities.                                                                  

20:31 Reassessment: Patient appears in no apparent distress at this time. attempted to call   aa9 

      report Patient denies pain at this time.                                                    

                                                                                                  

Vital Signs:                                                                                      

15:11  / 131; Pulse 154; Resp 34; Temp 97.1(TE); Pulse Ox 99% on R/A; Weight 87.54 kg;  ld1 

      Height 6 ft. 0 in. (182.88 cm); Pain 0/10;                                                  

15:23  / 101; Pulse 124; Resp 29; Pulse Ox 96% on 3 lpm NC;                             mb9 

16:12  / 89; Pulse 112; Resp 30; Pulse Ox 95% on 2 lpm NC; Pain 0/10;                   mb9 

16:39  / 91; Pulse 107; Resp 28; Pulse Ox 96% 2 lpm ; Pain 0/10;                        mb9 

17:44  / 97; Pulse 116; Resp 30; Pulse Ox 95% on 2 lpm NC; Pain 0/10;                   mb9 

19:44  / 98; Pulse 121; Resp 31 S; Pulse Ox 95% on 2 lpm NC;                            aa9 

15:11 Body Mass Index 26.18 (87.54 kg, 182.88 cm)                                             ld1 

                                                                                                  

ED Course:                                                                                        

14:56 Patient arrived in ED.                                                                  as  

14:59 Arnold Gamino MD is Attending Physician.                                               [id number]

15:01 Harmony Sanchez RN is Primary Nurse.                                               mb9 

15:11 Triage completed.                                                                       ld1 

15:13 Arm band placed on Patient placed in an exam room, on a stretcher, on cardiac monitor,  ph  

      on pulse oximetry.                                                                          

15:13 Initial lab(s) drawn, by ED staff, sent to lab. Inserted saline lock: 20 gauge in right ph  

      forearm, using aseptic technique. Blood collected. inserted by Viola SKINNER                     

15:13 EKG done, by ED staff, reviewed by Arnold Gamino MD.                                     mb9 

15:14 Patient has correct armband on for positive identification. Bed in low position. Call   ph  

      light in reach. Side rails up X 1. Client placed on continuous cardiac and pulse            

      oximetry monitoring. NIBP monitoring applied. Door closed. Noise minimized. Warm            

      blanket given.                                                                              

15:59 SARS-COV-2 Antigen Rapid Sent.                                                          mb9 

16:04 XRAY Chest (1 view) In Process Unspecified.                                             EDMS

16:28 Collin Myers MD is Hospitalizing Provider.                                           kb  

19:53 No provider procedures requiring assistance completed.                                  aa9 

20:52 Patient admitted, IV remains in place.                                                  aa9 

                                                                                                  

Administered Medications:                                                                         

15:20 Drug: Diltiazem 5 mg Route: IVP; Site: left forearm;                                    mb9 

15:50 Follow up: Response: No adverse reaction                                                mb9 

15:51 CANCELLED (Duplicate Order): Diltiazem 5 mg IVP once; Over 2 minutes                    [id number]

15:58 Drug: Diltiazem 5 mg Route: IVP; Site: left forearm;                                    mb9 

16:13 Follow up: Response: No adverse reaction                                                mb9 

16:09 Drug: Lasix (furosemide) 60 mg Route: IVP; Site: left forearm;                          mb9 

16:39 Follow up: Response: No adverse reaction                                                mb9 

                                                                                                  

                                                                                                  

Medication:                                                                                       

15:15 VIS not applicable for this client.                                                     ph  

                                                                                                  

Outcome:                                                                                          

16:28 Decision to Hospitalize by Provider.                                                    kb  

20:33 Condition: stable                                                                       aa9 

20:52 Admitted to Med/surg accompanied by nurse, via wheelchair, room 219, Report called to   mackenzie BARRIGA RN                                                                                    

20:52 Instructed on the need for admit.                                                           

20:54 Patient left the ED.                                                                    as6 

                                                                                                  

Signatures:                                                                                       

Dispatcher [employer]                           EDMS                                                 

Helga Luna, GALEN STAUFFER-Mahi Logan Patricia, RN                      RN                                                      

Keesha Stewart RN                     RN   ld1                                                  

Pop Angel RN RN   as6                                                  

Arnold Gamino MD MD   [id number]                                                 

Deidre Byrd RN RN   aa9                                                  

Harmony Sanchez RN                 RN   mb9                                                  

                                                                                                  

**************************************************************************************************

## 2022-11-29 NOTE — EDPHYS
Physician Documentation                                                                           

 [facility]                                                                 

Name: Lucas Reyes Jr                                                                              

Age: 77 yrs                                                                                       

Sex: Male                                                                                         

: 1945                                                                                   

MRN: [id number]                                                                                   

Arrival Date: 2022                                                                          

Time: 14:56                                                                                       

Account#: [id number]                                                                            

Bed 7                                                                                             

Private MD:                                                                                       

ED Physician Arnold Gamino                                                                        

HPI:                                                                                              

                                                                                             

15:10 This 77 yrs old  Male presents to ER via Unassigned with complaints of          jr11

      Shortness Of Breath, Irregular Pulse - 170s.                                                

15:10 The patient has shortness of breath with light activity. Onset: The symptoms/episode    jr11

      began/occurred 2-3 days ago. Duration: The symptoms are intermittent. The patient's         

      shortness of breath is aggravated by nothing, is alleviated by nothing. Associated          

      signs and symptoms: Pertinent negatives: chest pain, fever. Severity of symptoms: At        

      their worst the symptoms were moderate in the emergency department the symptoms are         

      worse. no h/o afib but states he is on apixaban .                                           

                                                                                                  

Historical:                                                                                       

- Allergies:                                                                                      

15:11 No Known Allergies;                                                                     ld1 

- Home Meds:                                                                                      

15:11 apixaban 5 mg oral tab 1 tab 2 times per day [Active]; atorvastatin 20 mg oral tab 1    ld1 

      tab once daily [Active]; cyanocobalamin (vitamin B-12) 1,000 mcg oral cap [Active];         

      furosemide 40 mg Oral tab 1 tab once daily [Active]; lansoprazole 30 mg oral cpDR 1 cap     

      once daily [Active]; metformin 500 mg Oral Tb24 2 tabs once daily [Active]; thiamine        

      HCl (vitamin B1) 100 mg Oral tab [Active]; omeprazole 20 mg Oral cpDR 2 caps 2 times        

      per day [Active];                                                                           

- PMHx:                                                                                           

15:11 Hypertensive disorder; Hypercholesterolemia; Diabetes mellitus; Stricture of esophagus; ld1 

      Anemia; Heart disease;                                                                      

- PSHx:                                                                                           

15:11 left hip sx;                                                                            ld1 

                                                                                                  

- Immunization history:: Adult Immunizations up to date, Client reports receiving the             

  2nd dose of the Covid vaccine, Adult Immunizations.                                             

- Social history:: Smoking status: Patient denies any tobacco usage or history of.                

  Patient/guardian denies using alcohol, Smoking status: .                                        

                                                                                                  

                                                                                                  

ROS:                                                                                              

15:10 All other systems are negative.                                                         jr11

                                                                                                  

Exam:                                                                                             

15:10 Constitutional:  appears chronically ill Head/Face:  Normocephalic, atraumatic. Eyes:   jr11

      Extra-ocular motions intact.  Lids and lashes normal.  Conjunctiva and sclera are           

      non-icteric and not injected.  Cornea within normal limits.  Periorbital areas with no      

      swelling, redness, or edema. ENT:  Nares patent. No nasal discharge, no septal              

      abnormalities noted.  Oropharynx with no redness, swelling, or masses, exudates, or         

      evidence of obstruction, uvula midline.  Mucous membranes moist. Cardiovascular:            

      irregularly irregular rhythm  Respiratory:  diminished at the bases Abdomen/GI:  Soft,      

      non-tender, with normal bowel sounds.  No distension or tympany.  No guarding or            

      rebound.  No evidence of tenderness throughout. Back:  No spinal tenderness.  No            

      costovertebral tenderness.  Full range of motion. Skin:  Warm, dry with normal turgor.      

      Normal color with no rashes, no lesions, and no evidence of cellulitis. MS/ Extremity:      

      Pulses equal, no cyanosis.  Neurovascular intact.  Full, normal range of motion. Neuro:     

       Awake and alert, GCS 15, oriented to person, place, time, and situation.  No gross         

      motor or sensory deficits.                                                                  

                                                                                                  

Vital Signs:                                                                                      

15:11  / 131; Pulse 154; Resp 34; Temp 97.1(TE); Pulse Ox 99% on R/A; Weight 87.54 kg;  ld1 

      Height 6 ft. 0 in. (182.88 cm); Pain 0/10;                                                  

15:23  / 101; Pulse 124; Resp 29; Pulse Ox 96% on 3 lpm NC;                             mb9 

16:12  / 89; Pulse 112; Resp 30; Pulse Ox 95% on 2 lpm NC; Pain 0/10;                   mb9 

16:39  / 91; Pulse 107; Resp 28; Pulse Ox 96% 2 lpm ; Pain 0/10;                        mb9 

17:44  / 97; Pulse 116; Resp 30; Pulse Ox 95% on 2 lpm NC; Pain 0/10;                   mb9 

19:44  / 98; Pulse 121; Resp 31 S; Pulse Ox 95% on 2 lpm NC;                            aa9 

15:11 Body Mass Index 26.18 (87.54 kg, 182.88 cm)                                             ld1 

                                                                                                  

MDM:                                                                                              

15:08 Patient medically screened.                                                             jr11

15:10 Differential diagnosis: afib RVR. Data reviewed: vital signs, nurses notes.             jr11

15:10 ED course: EKG interpreted by me shows atrial fibrillation rapid ventricular response   jr11

      slightly prolonged QRS at 134, no delta wave. No STEMI..                                    

                                                                                                  

                                                                                             

15:09 Order name: Basic Metabolic Panel; Complete Time: 15:51                                 [facility]

                                                                                             

15:09 Order name: CBC with Diff; Complete Time: 15:29                                         

                                                                                             

15:09 Order name: LFT's; Complete Time: 15:51                                                 [facility]

                                                                                             

15:09 Order name: NT PRO-BNP; Complete Time: 15:51                                            [facility]

                                                                                             

15:09 Order name: PT-INR; Complete Time: 15:29                                                

                                                                                             

15:09 Order name: Troponin HS; Complete Time: 15:51                                           [facility]

                                                                                             

15:52 Order name: SARS-COV-2 Antigen Rapid; Complete Time: 17:49                              [facility]

                                                                                             

18:42 Order name: Urine Dipstick-Ancillary                                                    [facility]

                                                                                             

19:11 Order name: Protime (+INR)                                                              [facility]

                                                                                             

19:19 Order name: Troponin High Sensitivity                                                   [facility]

                                                                                             

19:23 Order name: Phosphorus                                                                  [facility]

                                                                                             

19:23 Order name: Creatine Phosphokinase                                                      [facility]

                                                                                             

19:23 Order name: T4 Free                                                                     [facility]

                                                                                             

19:23 Order name: Magnesium                                                                   [facility]

                                                                                             

15:09 Order name: XRAY Chest (1 view); Complete Time: 16:11                                   [facility]

                                                                                             

15:09 Order name: EKG; Complete Time: 15:10                                                   [facility]

                                                                                             

15:09 Order name: Cardiac monitoring; Complete Time: 15:13                                    [facility]

                                                                                             

15:09 Order name: EKG - Nurse/Tech; Complete Time: 15:13                                      

                                                                                             

15:09 Order name: IV Saline Lock; Complete Time: 15:13                                        [facility]

                                                                                             

15:09 Order name: Labs collected and sent; Complete Time: 15:13                               [facility]

                                                                                             

15:09 Order name: O2 Per Protocol; Complete Time: 15:13                                       [facility]

                                                                                             

15:09 Order name: O2 Sat Monitoring; Complete Time: 15:13                                     [facility]

                                                                                             

19:23 Order name: Thyroid Stimulating Hormone                                                 [facility]

                                                                                                  

Administered Medications:                                                                         

15:20 Drug: Diltiazem 5 mg Route: IVP; Site: left forearm;                                    mb9 

15:50 Follow up: Response: No adverse reaction                                                mb9 

15:51 CANCELLED (Duplicate Order): Diltiazem 5 mg IVP once; Over 2 minutes                    jr11

15:58 Drug: Diltiazem 5 mg Route: IVP; Site: left forearm;                                    mb9 

16:13 Follow up: Response: No adverse reaction                                                mb9 

16:09 Drug: Lasix (furosemide) 60 mg Route: IVP; Site: left forearm;                          mb9 

16:39 Follow up: Response: No adverse reaction                                                mb9 

                                                                                                  

                                                                                                  

Disposition Summary:                                                                              

22 16:28                                                                                    

Hospitalization Ordered                                                                           

      Hospitalization Status: Inpatient Admission                                             kb  

      Provider: Collin Myers  

      Location: Telemetry/MedSurg (Inpatient)                                                 kb  

      Condition: Stable                                                                       kb  

      Problem: new                                                                            kb  

      Symptoms: are unchanged                                                                 kb  

      Bed/Room Type: Standard                                                                   

      Room Assignment: 219(22 20:17)                                                    cg  

      Diagnosis                                                                                   

        - Unspecified atrial fibrillation - with RVR                                          kb  

        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  

      Forms:                                                                                      

        - Medication Reconciliation Form                                                      kb  

        - SBAR form                                                                           kb  

Signatures:                                                                                       

Dispatcher [employer]                           EDMS                                                 

Helga Luna, XIOMY-C                 EDILP-Brooke Gaming, RN                      RN   ph                                                   

Guru Yee, PA                         PA   Peyton Sy RN                       RN   cg                                                   

Keesha Stewart RN                     RN   ld1                                                  

Arnold Gamino MD MD   jr11                                                 

Harmony Sanchez RN                 RN   mb9                                                  

                                                                                                  

Corrections: (The following items were deleted from the chart)                                    

15:51 15:50 Diltiazem 5 mg IVP once; Over 2 minutes ordered. jr                             jr11

20:17 16:28 kb                                                                                cg  

                                                                                                  

**************************************************************************************************

## 2022-11-29 NOTE — P.HP
Certification for Inpatient


Patient admitted to: Inpatient


With expected LOS: >2 Midnights


Patient will require the following post-hospital care: None


Practitioner: I am a practitioner with admitting privileges, knowledge of 

patient current condition, hospital course, and medical plan of care.


Services: Services provided to patient in accordance with Admission requirements

found in Title 42 Section 412.3 of the Code of Federal Regulations





Patient History


Date of Service: 11/29/22


Reason for admission: SOB, Afib with RVR


History of Present Illness: 


Patient is a 77-year-old male with a past medical history significant for 

hypertension, DM 2, esophageal stricture, anemia, GERD, HLD who presents with 

complaint of shortness of breath has been ongoing for the past 3 months.  

Patient reported associated signs and symptoms of cough, orthopnea, chest 

tightness, chills, weakness, fatigue, blurry vision and palpitations.  Patient 

reported that shortness of breath became worse yesterday.  Patient denies any 

other signs and symptoms.  Symptoms are aggravated or relieved by nothing.  

Patient decided to present to the hospital due to worsening symptoms.





Allergies





No Known Allergies Allergy (Verified 04/26/12 17:47)


   








- Past Medical/Surgical History


-: DM2


-: GERD


-: HLD


Past Surgical History: Reviewed- Non-Contributory





- Family History


Family History: Reviewed- Non-Contributory





- Social History


Smoking Status: Never smoker


Alcohol use: No


CD- Drugs: No


Caffeine use: Yes


Place of Residence: Home





Review of Systems


General: Chills, Weakness, Other (Fatigue.)


Eyes: Other (Blurry vision)


ENT: Unremarkable


Respiratory: Cough, Shortness of Breath, SOB with Excertion, Other (Orthopnea, 

chest tightness)


Cardiovascular: Palpitations


Gastrointestinal: Unremarkable


Genitourinary: Unremarkable


Musculoskeletal: Unremarkable


Integumentary: Unremarkable


Neurological: Weakness


Lymphatics: Unremarkable





Physical Examination





- Physical Exam


General: Alert, Oriented x3, Cooperative, Mild distress


HEENT: Atraumatic, PERRLA, Mucous membr. moist/pink, EOMI, Sclerae nonicteric


Neck: Supple, 2+ carotid pulse no bruit, No LAD, Without JVD or thyroid 

abnormality


Respiratory: Clear to auscultation bilaterally, Diminished


Cardiovascular: No edema, No murmurs, Abnormal pulses, Irregular heart 

rate/rhythm


Capillary refill: <2 Seconds


Gastrointestinal: Normal bowel sounds, Non-distended, No tenderness


Musculoskeletal: No clubbing, No swelling, No tenderness


Integumentary: No rashes, No breakdown, No significant lesion


Neurological: Normal speech, Normal tone, Normal affect


Lymphatics: No axilla or inguinal lymphadenopathy





- Studies


Laboratory Data (last 24 hrs)





11/29/22 15:05: PT 14.8 H, INR 1.35


11/29/22 15:05: WBC 14.00 H, Hgb 12.0 L, Hct 38.7 L, Plt Count 230


11/29/22 15:05: Sodium 139, Potassium 4.4, BUN 26 H, Creatinine 1.52 H, Glucose 

220 H, Total Bilirubin 1.1 H, AST 20, ALT 18, Alkaline Phosphatase 117








Assessment and Plan





- Plan


--Acute on chronic diastolic or systolic CHF exacerbation.  Echocardiogram 

pending to assess LV\valvular function and wall motion.  Continue diuresis with 

Lasix.  Daily weight and strict I/O.  Cardiology consulted.  We will await 

further recommendations.


--A. fib with RVR.  Patient given diltiazem in the ER.  Patient placed on 

metoprolol.  Patient started on heparin drip.  Further management per 

cardiologist.


-- Elevated troponin.  Likely secondary to demand ischemia.  Troponin levels 

trending down.  Telemetry to monitor for any significant arrhythmia.  Further 

management per cardiologist.


--DM2.  BS monitoring with sliding scale insulin


-- History of esophageal stricture.  Continue supportive care.


--GERD.  Continue Protonix..


--HLD.  Continue statin when available .


--Hypertension.  Continue home medications when available.  We will manage BP 

with labetalol as needed.


-- Leukocytosis.  Likely reactive.  Blood cultures pending to rule out any 

infection.  Will reassess levels in a.m.


--Anemia of chronic disease.  H&H stable.  We will continue to monitor 

hemoglobin and transfuse if less than 7.0.


--CKD 3A.  Baseline functions unknown.  We will continue to monitor renal 

functions.


--DVT prophylaxis with heparin drip.   





Discharge Plan: Home


Plan to discharge in: Greater than 2 days





- Advance Directives


Does patient have a Living Will: No


Does patient have a Durable POA for Healthcare: No





- Code Status/Comfort Care


Code Status Assessed: Yes


Physician Review: Patient Assessed, Agree with Above Assessment and Plan


Critical Care: No

## 2022-11-30 LAB
BUN BLD-MCNC: 29 MG/DL (ref 7–18)
GLUCOSE SERPLBLD-MCNC: 138 MG/DL (ref 74–106)
HCT VFR BLD CALC: 38.1 % (ref 39.6–49)
LYMPHOCYTES # SPEC AUTO: 0.9 K/UL (ref 0.7–4.9)
MCV RBC: 84.4 FL (ref 80–100)
PMV BLD: 10.5 FL (ref 7.6–11.3)
POTASSIUM SERPL-SCNC: 3.9 MMOL/L (ref 3.5–5.1)
RBC # BLD: 4.51 M/UL (ref 4.33–5.43)

## 2022-11-30 PROCEDURE — 5A09457 ASSISTANCE WITH RESPIRATORY VENTILATION, 24-96 CONSECUTIVE HOURS, CONTINUOUS POSITIVE AIRWAY PRESSURE: ICD-10-PCS

## 2022-11-30 RX ADMIN — Medication SCH ML: at 09:00

## 2022-11-30 RX ADMIN — SODIUM CHLORIDE SCH MG: 0.9 INJECTION, SOLUTION INTRAVENOUS at 10:52

## 2022-11-30 RX ADMIN — ASPIRIN 81 MG SCH: 81 TABLET ORAL at 09:00

## 2022-11-30 RX ADMIN — HEPARIN SODIUM AND DEXTROSE SCH MLS: 5000; 5 INJECTION INTRAVENOUS at 22:27

## 2022-11-30 RX ADMIN — Medication SCH ML: at 20:32

## 2022-11-30 RX ADMIN — ALBUMIN (HUMAN) SCH: 5 SOLUTION INTRAVENOUS at 09:00

## 2022-11-30 RX ADMIN — HUMAN INSULIN SCH: 100 INJECTION, SOLUTION SUBCUTANEOUS at 11:30

## 2022-11-30 RX ADMIN — METOPROLOL TARTRATE SCH MG: 25 TABLET ORAL at 17:10

## 2022-11-30 RX ADMIN — HUMAN INSULIN SCH: 100 INJECTION, SOLUTION SUBCUTANEOUS at 20:31

## 2022-11-30 RX ADMIN — METOPROLOL TARTRATE SCH MG: 25 TABLET ORAL at 05:57

## 2022-11-30 RX ADMIN — ALBUMIN (HUMAN) SCH MG: 5 SOLUTION INTRAVENOUS at 17:11

## 2022-11-30 RX ADMIN — HUMAN INSULIN SCH: 100 INJECTION, SOLUTION SUBCUTANEOUS at 07:30

## 2022-11-30 RX ADMIN — HUMAN INSULIN SCH: 100 INJECTION, SOLUTION SUBCUTANEOUS at 16:30

## 2022-11-30 NOTE — RAD REPORT
EXAM DESCRIPTION:  Melba Single View11/30/2022 8:05 am

 

CLINICAL HISTORY:  Shortness of breath

 

COMPARISON:  November 29, 2022

 

FINDINGS:  Worsening in moderate bilateral pulmonary opacities

 

Heart remains enlarged. Probable small bilateral pleural effusions

 

IMPRESSION:  Worsening in CHF

## 2022-11-30 NOTE — EKG
Test Date:    2022-11-29               Test Time:    15:08:32

Technician:   MB                                     

                                                     

MEASUREMENT RESULTS:                                       

Intervals:                                           

Rate:         149                                    

VA:                                                  

QRSD:         134                                    

QT:           308                                    

QTc:          485                                    

Axis:                                                

P:                                                   

VA:                                                  

QRS:          -21                                    

T:            197                                    

                                                     

INTERPRETIVE STATEMENTS:                                       

                                                     

Atrial fibrillation with rapid ventricular response with premature

ventricular or aberrantly conducted complexes

Nonspecific intraventricular block

Cannot rule out Anteroseptal infarct, age undetermined

T wave abnormality, consider inferolateral ischemia or digitalis effect

Abnormal ECG

Compared to ECG 07/15/2011 17:15:41

Ventricular premature complex(es) now present

Myocardial infarct finding now present

T-wave abnormality now present

Possible ischemia now present

Sinus bradycardia no longer present

Left-axis deviation no longer present

Intraventricular conduction delay no longer present

ST (T wave) deviation no longer present



Electronically Signed On 11-30-22 16:23:07 CST by Obed Easton

## 2022-12-01 LAB
BUN BLD-MCNC: 33 MG/DL (ref 7–18)
GLUCOSE SERPLBLD-MCNC: 111 MG/DL (ref 74–106)
POTASSIUM SERPL-SCNC: 3.5 MMOL/L (ref 3.5–5.1)

## 2022-12-01 RX ADMIN — HUMAN INSULIN SCH: 100 INJECTION, SOLUTION SUBCUTANEOUS at 20:40

## 2022-12-01 RX ADMIN — ASPIRIN 81 MG SCH MG: 81 TABLET ORAL at 09:34

## 2022-12-01 RX ADMIN — HUMAN INSULIN SCH: 100 INJECTION, SOLUTION SUBCUTANEOUS at 07:30

## 2022-12-01 RX ADMIN — SODIUM CHLORIDE SCH MG: 0.9 INJECTION, SOLUTION INTRAVENOUS at 09:34

## 2022-12-01 RX ADMIN — METOPROLOL TARTRATE SCH MG: 25 TABLET ORAL at 17:11

## 2022-12-01 RX ADMIN — HEPARIN SODIUM AND DEXTROSE SCH MLS: 5000; 5 INJECTION INTRAVENOUS at 16:41

## 2022-12-01 RX ADMIN — ALBUMIN (HUMAN) SCH MG: 5 SOLUTION INTRAVENOUS at 09:34

## 2022-12-01 RX ADMIN — Medication SCH ML: at 20:41

## 2022-12-01 RX ADMIN — HUMAN INSULIN SCH: 100 INJECTION, SOLUTION SUBCUTANEOUS at 16:30

## 2022-12-01 RX ADMIN — HUMAN INSULIN SCH: 100 INJECTION, SOLUTION SUBCUTANEOUS at 11:30

## 2022-12-01 RX ADMIN — Medication SCH: at 07:37

## 2022-12-01 RX ADMIN — METOPROLOL TARTRATE SCH MG: 25 TABLET ORAL at 05:36

## 2022-12-01 RX ADMIN — ALBUMIN (HUMAN) SCH MG: 5 SOLUTION INTRAVENOUS at 20:40

## 2022-12-01 NOTE — ECHO
HEIGHT: 6 ft 0 in   WEIGHT: 194 lb 0 oz   DATE OF STUDY: 11/30/2022   REFER DR: Collin Myers MD

2-DIMENSIONAL: YES

     M.MODE: YES

 DOPPLER: YES

COLOR FLOW: YES



                    TDS:  

PORTABLE: YES

 DEFINITY:  

BUBBLE STUDY: 





DIAGNOSIS:  ATRIAL FIBRILLATION/ CONGESTIVE HEART FAILURE



CARDIAC HISTORY:  

CATHERIZATION: 

SURGERY: 

PROSTHETIC VALVE: 

PACEMAKER: 





MEASUREMENTS (cm)

    DIASTOLIC (NORMALS)                 SYSTOLIC (NORMALS)

IVSd                 1.1 (0.6-1.2)                    LA Diam 3.6 (1.9-4.0)     LVEF       
  27%  

LVIDd               5.3 (3.5-5.7)                        LVIDs      4.6 (2.0-3.5)     %FS  
        13%

LVPWd             1.4 (0.6-1.2)

Ao Diam           3.3 (2.0-3.7)



2 DIMENSIONAL ASSESSMENT:

RIGHT ATRIUM:                   NORMAL

LEFT ATRIUM:       NORMAL



RIGHT VENTRICLE:            NORMAL

LEFT VENTRICLE: NORMAL SIZE



TRICUSPID VALVE:             NORMAL

MITRAL VALVE:     MITRAL ANNULAR CALCIFICATION



PULMONIC VALVE:             NORMAL

AORTIC VALVE:     NORMAL



PERICARDIAL EFFUSION: NONE

AORTIC ROOT:      NORMAL





LEFT VENTRICULAR WALL MOTION:     SEVERE GLOBAL HYPOKINESIS



DOPPLER/COLOR FLOW:     MODERATE PULMONARY HYPERTENSION



COMMENTS:      

  1. MODERATE PULMONARY HYPERTENSION

  2. SEVERE GLOBAL HYPOKINESIS

  3. EJECTION FRACTION 25-30%



TECHNOLOGIST:   CASA SHEEHAN

## 2022-12-02 LAB
BUN BLD-MCNC: 42 MG/DL (ref 7–18)
GLUCOSE SERPLBLD-MCNC: 109 MG/DL (ref 74–106)
HCT VFR BLD CALC: 36.9 % (ref 39.6–49)
LYMPHOCYTES # SPEC AUTO: 1.1 K/UL (ref 0.7–4.9)
MAGNESIUM SERPL-MCNC: 1.3 MG/DL (ref 1.8–2.4)
MCV RBC: 83.5 FL (ref 80–100)
PMV BLD: 11.5 FL (ref 7.6–11.3)
POTASSIUM SERPL-SCNC: 3 MMOL/L (ref 3.5–5.1)
RBC # BLD: 4.42 M/UL (ref 4.33–5.43)

## 2022-12-02 RX ADMIN — ASPIRIN 81 MG SCH MG: 81 TABLET ORAL at 09:46

## 2022-12-02 RX ADMIN — HUMAN INSULIN SCH: 100 INJECTION, SOLUTION SUBCUTANEOUS at 21:00

## 2022-12-02 RX ADMIN — METFORMIN HYDROCHLORIDE SCH MG: 500 TABLET, FILM COATED ORAL at 18:07

## 2022-12-02 RX ADMIN — HUMAN INSULIN SCH: 100 INJECTION, SOLUTION SUBCUTANEOUS at 07:30

## 2022-12-02 RX ADMIN — FUROSEMIDE SCH MG: 40 TABLET ORAL at 21:16

## 2022-12-02 RX ADMIN — HUMAN INSULIN SCH: 100 INJECTION, SOLUTION SUBCUTANEOUS at 11:30

## 2022-12-02 RX ADMIN — HUMAN INSULIN SCH: 100 INJECTION, SOLUTION SUBCUTANEOUS at 16:30

## 2022-12-02 RX ADMIN — APIXABAN SCH MG: 5 TABLET, FILM COATED ORAL at 21:16

## 2022-12-02 RX ADMIN — POTASSIUM CHLORIDE SCH MLS: 200 INJECTION, SOLUTION INTRAVENOUS at 16:08

## 2022-12-02 RX ADMIN — SODIUM CHLORIDE SCH MG: 0.9 INJECTION, SOLUTION INTRAVENOUS at 09:45

## 2022-12-02 RX ADMIN — Medication SCH ML: at 21:00

## 2022-12-02 RX ADMIN — Medication SCH: at 07:26

## 2022-12-02 RX ADMIN — ALBUMIN (HUMAN) SCH MG: 5 SOLUTION INTRAVENOUS at 09:46

## 2022-12-02 RX ADMIN — METOPROLOL TARTRATE SCH MG: 25 TABLET ORAL at 17:46

## 2022-12-02 RX ADMIN — METOPROLOL TARTRATE SCH MG: 25 TABLET ORAL at 06:30

## 2022-12-02 RX ADMIN — POTASSIUM CHLORIDE SCH MLS: 200 INJECTION, SOLUTION INTRAVENOUS at 21:50

## 2022-12-02 NOTE — P.PN
Date of Service: 12/01/22





Subjective


She has continued to show improvement.  Respiratory status has improved.  

Weaning down off of high flow oxygen.





Physical Examination





- Physical Exam


General: Alert, Oriented x3, Cooperative, Mild distress


Respiratory: Clear to auscultation bilaterally, Diminished


Cardiovascular: Irregular heart rate/rhythm


Gastrointestinal: Normal bowel sounds, Non-distended, No tenderness


Musculoskeletal: No clubbing, No swelling, No tenderness


Neurological: No focal deficits





Assessment and Plan





-Assessment


--Acute on chronic diastolic or systolic CHF exacerbation


--Atrial fibrillation with RVR


--Pulmonary hypertension


--Elevated troponin


--DM2


--History of esophageal stricture


--GERD


--HLD


--Hypertension


--Leukocytosis


--Anemia of chronic disease


--CKD 3A


--DVT prophylaxis





-Plan


1. Echocardiogram reviewed.  Patient with systolic and diastolic dysfunction.  

Pending off of high flow.


2. Continue with antihypertensives


3. Continue with medication for rate control and continue with anticoagulation


4. Cardiology consultation and pulmonary consultation appreciated


5. Aggressive diuresis


6. Strict I's and O's


7. Repeat CXR with stable bilateral pulmonary edema


8. Daily weights


9. Education regarding diet and treatment of congestive heart failure

## 2022-12-02 NOTE — P.PN
Date of Service: 11/30/22





Subjective


Patient is still short of breath.  Patient is now on high flow oxygen.





Physical Examination





- Physical Exam


General: Alert, Oriented x3, Cooperative, Mild distress


Respiratory: Clear to auscultation bilaterally, Diminished


Cardiovascular: Irregular heart rate/rhythm


Gastrointestinal: Normal bowel sounds, Non-distended, No tenderness


Musculoskeletal: No clubbing, No swelling, No tenderness


Neurological: No focal deficits





Assessment and Plan





-Assessment


--Acute on chronic diastolic or systolic CHF exacerbation


--Atrial fibrillation with RVR


--Pulmonary hypertension


--Elevated troponin


--DM2


--History of esophageal stricture


--GERD


--HLD


--Hypertension


--Leukocytosis


--Anemia of chronic disease


--CKD 3A


--DVT prophylaxis





-Plan


1. Echocardiogram 


2. Continue with antihypertensives


3. Continue with medication for rate control and continue with anticoagulation


4. Cardiology consultation and pulmonary consultation appreciated


5. Aggressive diuresis


6. Strict I's and O's


7. Repeat CXR


8. Daily weights


9. Education regarding diet and treatment of congestive heart failure

## 2022-12-02 NOTE — P.CNS
Date of Consult: 12/02/22


Reason for Consult: Shortness of breath


Chief Complaint: SOB, Afib with RVR


History of Present Illness: 


Patient is 77 years of age with a past medical history of diabetes hypertension 

mated with chronic shortness of breath been going on for the past year has b

ecome worse over the past 3 months shortness of breath on mild exertion denies 

any fever chills has no prior history of smoking history of coronary artery 

disease


According to the emergency room he is on the following meds


apixaban 5 mg oral tab 1 tab 2 times per day [Active]; atorvastatin 20 mg oral 

tab 1 ld1 


 tab once daily [Active]; cyanocobalamin (vitamin B-12) 1,000 mcg oral cap 

[Active]; 


 furosemide 40 mg Oral tab 1 tab once daily [Active]; lansoprazole 30 mg oral 

cpDR 1 cap 


 once daily [Active]; metformin 500 mg Oral Tb24 2 tabs once daily [Active]; 

thiamine 


 HCl (vitamin B1) 100 mg Oral tab [Active]; omeprazole 20 mg Oral cpDR 2 caps 2 

times 


Allergies





No Known Allergies Allergy (Verified 04/26/12 17:47)


   








- Past Medical/Surgical History


Diabetic: Yes


-: DM2


-: GERD


-: HLD





- Social History


Alcohol use: Yes


CD- Drugs: No


Caffeine use: Yes


Place of Residence: Home





Review of Systems


10-point ROS is otherwise unremarkable





Physical Examination














Temp Pulse Resp BP Pulse Ox


 


 97.6 F   61   16   109/59 L  100 


 


 12/02/22 08:00  12/02/22 09:46  12/02/22 08:00  12/02/22 09:46  12/02/22 08:00








General: Alert, In no apparent distress, Oriented x3


HEENT: Atraumatic


Neck: Supple


Respiratory: Clear to auscultation bilaterally


Cardiovascular: No edema, Abnormal pulses, Irregular heart rate/rhythm


Gastrointestinal: Normal bowel sounds, Soft and benign





- Problems


(1) Congestive heart failure


Current Visit: Yes   Status: Acute   


Plan: 


Patient is 77 years of age admitted with progressive dyspnea he has cardiomegaly

 signs and symptoms consistent with congestive heart failure he is x-ray shows 

interstitial changes BNP is elevated renal insufficiency his ejection fraction 

is very poor can DC heparin for now opponent's are probably mildly elevated from

 his congestive heart failure audiology consult is pending patient has an 

esophageal stricture has had multiple dilatations in the past prior history of 

coronary artery disease Home medication list reviewed


Qualifiers: 


   Heart failure type: systolic   Heart failure chronicity: acute   Qualified 

Code(s): I50.21 - Acute systolic (congestive) heart failure

## 2022-12-02 NOTE — P.PN
Date of Service: 12/02/22





Subjective


Patient continues to improve.  Clinical symptoms are improving.  On 60% FiO2 on 

high flow and now on 2 L of oxygen.





Physical Examination





- Physical Exam


General: Alert, Oriented x3, Cooperative, Mild distress


Respiratory: Clear to auscultation bilaterally, Diminished


Cardiovascular: Irregular heart rate/rhythm


Gastrointestinal: Normal bowel sounds, Non-distended, No tenderness


Musculoskeletal: No clubbing, No swelling, No tenderness


Neurological: No focal deficits





Assessment and Plan





-Assessment


--Acute on chronic diastolic or systolic CHF exacerbation


--Atrial fibrillation with RVR


--Pulmonary hypertension


--Elevated troponin


--DM2


--History of esophageal stricture


--GERD


--HLD


--Hypertension


--Leukocytosis


--Anemia of chronic disease


--CKD 3A


--DVT prophylaxis





-Plan


1. Echocardiogram reviewed.  Patient with systolic and diastolic dysfunction.  

Weaning off of high flow. Start working with PT


2. Continue with antihypertensives


3. Continue with medication for rate control and continue with anticoagulation


4. Cardiology consultation and pulmonary consultation appreciated


5. Aggressive diuresis


6. Strict I's and O's


7. Repeat CXR in AM


8. Daily weights


9. Education regarding diet and treatment of congestive heart failure

## 2022-12-03 VITALS — TEMPERATURE: 97.6 F

## 2022-12-03 VITALS — SYSTOLIC BLOOD PRESSURE: 109 MMHG | DIASTOLIC BLOOD PRESSURE: 72 MMHG

## 2022-12-03 VITALS — OXYGEN SATURATION: 97 %

## 2022-12-03 LAB
ALBUMIN SERPL BCP-MCNC: 2.8 G/DL (ref 3.4–5)
ALP SERPL-CCNC: 83 U/L (ref 45–117)
ALT SERPL W P-5'-P-CCNC: 64 U/L (ref 12–78)
AST SERPL W P-5'-P-CCNC: 67 U/L (ref 15–37)
BUN BLD-MCNC: 37 MG/DL (ref 7–18)
GLUCOSE SERPLBLD-MCNC: 117 MG/DL (ref 74–106)
HCT VFR BLD CALC: 35.3 % (ref 39.6–49)
LYMPHOCYTES # SPEC AUTO: 1.3 K/UL (ref 0.7–4.9)
MAGNESIUM SERPL-MCNC: 2 MG/DL (ref 1.8–2.4)
MCV RBC: 83.7 FL (ref 80–100)
PMV BLD: 10.7 FL (ref 7.6–11.3)
POTASSIUM SERPL-SCNC: 3.6 MMOL/L (ref 3.5–5.1)
RBC # BLD: 4.22 M/UL (ref 4.33–5.43)

## 2022-12-03 RX ADMIN — FUROSEMIDE SCH MG: 40 TABLET ORAL at 09:19

## 2022-12-03 RX ADMIN — METOPROLOL TARTRATE SCH MG: 25 TABLET ORAL at 09:18

## 2022-12-03 RX ADMIN — ASPIRIN 81 MG SCH MG: 81 TABLET ORAL at 09:19

## 2022-12-03 RX ADMIN — METFORMIN HYDROCHLORIDE SCH MG: 500 TABLET, FILM COATED ORAL at 09:19

## 2022-12-03 RX ADMIN — HUMAN INSULIN SCH: 100 INJECTION, SOLUTION SUBCUTANEOUS at 07:30

## 2022-12-03 RX ADMIN — Medication SCH ML: at 09:19

## 2022-12-03 RX ADMIN — APIXABAN SCH MG: 5 TABLET, FILM COATED ORAL at 09:19

## 2022-12-03 NOTE — RAD REPORT
EXAM DESCRIPTION:  [facility] Single View12/3/2022 7:04 am

 

CLINICAL HISTORY:  Shortness of breath

 

COMPARISON:  November 30, 2022

 

FINDINGS:  Bilateral pulmonary opacities have resolved

 

Heart has decreased in size

 

IMPRESSION:  Resolution in CHF

## 2022-12-03 NOTE — PN
Date of Progress Note:  12/02/2022



Mr. Reyes has been followed for atrial fibrillation, new onset.  It is now rate controlled on beta-bl
ockers and digoxin.  He is already on Eliquis.  He has chronic systolic congestive heart failure with
 acute exacerbation.  His oxygenation has improved.  His troponin was elevated secondary to demand is
chemia.  On 12/02/2022, patient's blood pressure was 138/79, atrial fibrillation rate is 72, O2 satur
ation 92% on room air, creatinine is 1.43, and potassium and magnesium have been corrected.  I am com
fortable with him going home on his present regimen, which should include Eliquis, aspirin, Lipitor, 
Lasix, beta-blockers.  I will make an arrangement for him to have an outpatient Lexiscan in the near 
future and I will see him then.





NB/TEGAN

DD:  12/03/2022 17:55:34Voice ID:  [id number]

DT:  12/03/2022 18:11:53Report ID:  [id number]

## 2022-12-03 NOTE — PN
Date of Progress Note:  12/01/2022



Mr. Reyes came in with congestive heart failure, acute on chronic systolic.  He came in with rapid at
rial fibrillation, elevated troponin.  Today 12/01/2022, his heart rate is only 74, blood pressure is
 normal.  He is feeling better.  O2 saturation still on high-flow oxygen.  He is saturating at 100%. 
 He remains on BiPAP setting of 60%.  His creatinine is 1.54, magnesium 1.3.  His potassium is 3.0.  
We need to supplement his potassium, supplement his magnesium.  He remains on Eliquis, aspirin, Lipit
or, Lasix, heparin drip.  It was discontinued.  He remains on metformin, metoprolol, Protonix.  Potas
sium is being supplemented.  I agree with his present regimen.  We will continue to follow him.





NB/TEGAN

DD:  12/03/2022 17:53:12Voice ID:  [id number]

DT:  12/03/2022 18:06:54Report ID:  [id number]

## 2022-12-03 NOTE — CON
Date of Consultation:  11/30/2022



Reason For Consultation:  Congestive heart failure, atrial fibrillation with rapid ventricular respon
se, and elevated troponin.



History Of Present Illness:  Mr. Reyes is 77.  He has a history of CHF that is chronic systolic with 
ejection fraction of 37% with pulmonary hypertension.  He has a history of dyslipidemia, diabetes, an
d hypertension.  He came in with congestive heart failure, atrial fibrillation with rapid ventricular
 response, and elevated troponin.  Denied any chest pain.  He has shortness of breath, PND, orthopnea
, and pedal edema.  Denied any syncope.  Denied any palpitations.  Denied any fever or chills.



Past Medical History:  As stated above.



Allergies:  NONE.



Review of Systems:

Negative.



Social History:  Negative.



Family History:  Noncontributory.



Medications:  Presently include heparin drip, aspirin, metoprolol, Lasix, and digoxin.



Physical Examination:

Vital Signs:  Stable.  He was in atrial fibrillation at a rate of 130.  His temperature was 97.2 and 
respiratory rate was 18. 

HEENT:  Negative. 

Neck:  Supple without any bruit, lymphadenopathy, JVD, or thyromegaly. 

Chest:  Rales at both bases. 

Cardiac:  Atrial fibrillation.  No murmurs, gallops, or rubs. 

Abdomen:  Benign. 

Extremities:  No clubbing or cyanosis.  He had 1+ edema to the knees. 

Skin:  Dry and intact. 

Neurologic:  He was nonfocal.  Pulses were present distally bilaterally.



Diagnostic Data:  Creatinine of 1.5.  Last echocardiogram showed an ejection fraction of 37% with mod
erate pulmonary hypertension.  EKG showed atrial fibrillation at a rate of 93.  His troponin was 149.




Impression And Plan:  

1.Acute-on-chronic systolic congestive heart failure.

2.Hypertension.

3.New onset atrial fibrillation with rapid ventricular response.

4.Diabetes.

5.Dyslipidemia.

6.Renal insufficiency.  

The patient is presently on appropriate therapy including metoprolol, digoxin, Lasix, heparin, and as
pirin.  He will eventually need to be anticoagulated with an oral anticoagulant.  He will eventually 
need to have an outpatient Lexiscan.  For now we will continue his present regimen.  I will continue 
to follow.





NB/TEGAN

DD:  12/03/2022 17:50:59Voice ID:  [id number]

DT:  12/03/2022 18:58:19Report ID:  [id number]

## 2022-12-12 NOTE — P.DS
Discharge Date: 12/03/22


Disposition: ROUTINE DISCHARGE


Discharge Condition: GOOD


Reason for Admission: SOB, Afib with RVR


Brief History of Present Illness: 





Patient is a 77-year-old male with a past medical history significant for 

hypertension, DM 2, esophageal stricture, anemia, GERD, HLD who presents with 

complaint of shortness of breath has been ongoing for the past 3 months.  

Patient reported associated signs and symptoms of cough, orthopnea, chest tig

htness, chills, weakness, fatigue, blurry vision and palpitations.  Patient 

reported that shortness of breath became worse yesterday.  Patient denies any 

other signs and symptoms.  Symptoms are aggravated or relieved by nothing.  

Patient decided to present to the hospital due to worsening symptoms.





Hospital Course: 





Patient's initial chest x-ray showed significant pulmonary edema.  Patient's 

echocardiogram reveals severe pulmonary hypertension.  Patient also had 

cardiomyopathy with an ejection fraction of 20%.  Patient was diuresed 

aggressively and patient's clinical symptoms have improved.  Chest x-ray shows 

complete resolution of congestive heart failure.  At this time, patient is 

clinically doing well and is stable for discharge with Cardiology and Pulmonary 

in 1-2 weeks.


Vital Signs/Physical Exam: 














Temp Pulse Resp BP Pulse Ox


 


 97.6 F   77   18   109/72   99 


 


 12/03/22 08:00  12/03/22 09:19  12/03/22 08:00  12/03/22 09:19  12/03/22 08:00








General: Alert, In no apparent distress, Oriented x3


Laboratory Data at Discharge: 














WBC  6.70 K/uL (4.3-10.9)   12/03/22  06:12    


 


Hgb  11.5 g/dL (13.6-17.9)  L  12/03/22  06:12    


 


Hct  35.3 % (39.6-49.0)  L  12/03/22  06:12    


 


Plt Count  203 K/uL (152-406)   12/03/22  06:12    


 


PT  14.3 SECONDS (9.5-12.5)  H  11/29/22  18:34    


 


INR  1.30   11/29/22  18:34    


 


APTT  73.3 SECONDS (24.3-36.9)  H  12/02/22  08:21    


 


Sodium  138 mmol/L (136-145)   12/03/22  06:12    


 


Potassium  3.6 mmol/L (3.5-5.1)  D 12/03/22  06:12    


 


BUN  37 mg/dL (7-18)  H  12/03/22  06:12    


 


Creatinine  1.43 mg/dL (0.55-1.3)  H  12/03/22  06:12    


 


Glucose  117 mg/dL ()  H  12/03/22  06:12    


 


Phosphorus  2.6 mg/dL (2.5-4.9)   12/03/22  06:12    


 


Magnesium  2.0 mg/dL (1.8-2.4)   12/03/22  06:12    


 


Total Bilirubin  0.7 mg/dL (0.2-1.0)   12/03/22  06:12    


 


AST  67 U/L (15-37)  H  12/03/22  06:12    


 


ALT  64 U/L (12-78)   12/03/22  06:12    


 


Alkaline Phosphatase  83 U/L ()   12/03/22  06:12    








Home Medications: 








Apixaban [Eliquis] 1 tab PO BID 12/02/22 


Atorvastatin Calcium 1 tab PO BEDTIME 12/02/22 


Cyanocobalamin [Vitamin B-12*] 1 tab PO DAILY 12/02/22 


Furosemide [Lasix*] 1 tab PO BID 12/02/22 


Metformin HCl [Glucophage*] 1 tab PO BID 12/02/22 


Omeprazole 2 cap PO BID 12/02/22 


Thiamine HCl 1 tab PO DAILY 12/02/22 


Apixaban [Eliquis] 5 mg PO BID #60 tab 12/03/22 


Aspirin Chewable [Aspirin Chewable*] 81 mg PO DAILY #30 tab.chew 12/03/22 


Hydrocodone 5/APAP 325 [Norco 5/325*] 1 tab PO Q6H PRN #30 tab 12/03/22 


Metoprolol Tartrate [Lopressor*] 25 mg PO BID 6AM 6PM #60 tab 12/03/22 


Potassium Chloride 20 meq PO DAILY #30 tab 12/03/22 


metOLazone [Zaroxolyn*] 2.5 mg PO M,W,F #15 tab 12/03/22 





New Medications: 


Aspirin Chewable [Aspirin Chewable*] 81 mg PO DAILY #30 tab.chew


Apixaban [Eliquis] 5 mg PO BID #60 tab


Metoprolol Tartrate [Lopressor*] 25 mg PO BID 6AM 6PM #60 tab


Hydrocodone 5/APAP 325 [Norco 5/325*] 1 tab PO Q6H PRN #30 tab


 PRN Reason: Pain Scale 5-7 (Moderate)


Potassium Chloride 20 meq PO DAILY #30 tab


metOLazone [Zaroxolyn*] 2.5 mg PO M,W,F #15 tab


Physician Discharge Instructions: 


-DC IV and DC home


-Follow-up with PCP in 1 to 2 weeks


-Follow-up with Cardiology in 1 to 2 weeks


-Please call Dr. Myers at [phone] if any questions regarding hospital stay


-Please call nursing station at [phone] if any nursing or medication 

questions


-Return to the emergency room if symptoms worsen





Diet: AHA


Activity: Fall precautions


Followup: 


NONE,NONE [Primary Care Provider] - 1-2 Weeks


Jake Dickey MD [ACTIVE - CAN ADMIT] - 1-2 Weeks


Time spent managing pt's care (in minutes): 35

## 2024-08-18 ENCOUNTER — HOSPITAL ENCOUNTER (INPATIENT)
Dept: HOSPITAL 97 - ER | Age: 79
LOS: 3 days | Discharge: HOME | DRG: 291 | End: 2024-08-21
Attending: HOSPITALIST | Admitting: STUDENT IN AN ORGANIZED HEALTH CARE EDUCATION/TRAINING PROGRAM
Payer: OTHER GOVERNMENT

## 2024-08-18 VITALS — BODY MASS INDEX: 28.5 KG/M2

## 2024-08-18 DIAGNOSIS — I13.0: Primary | ICD-10-CM

## 2024-08-18 DIAGNOSIS — Z79.01: ICD-10-CM

## 2024-08-18 DIAGNOSIS — I50.43: ICD-10-CM

## 2024-08-18 DIAGNOSIS — I48.91: ICD-10-CM

## 2024-08-18 DIAGNOSIS — M10.9: ICD-10-CM

## 2024-08-18 DIAGNOSIS — Z95.810: ICD-10-CM

## 2024-08-18 DIAGNOSIS — N18.30: ICD-10-CM

## 2024-08-18 DIAGNOSIS — K21.9: ICD-10-CM

## 2024-08-18 DIAGNOSIS — E83.42: ICD-10-CM

## 2024-08-18 DIAGNOSIS — E11.22: ICD-10-CM

## 2024-08-18 DIAGNOSIS — R31.29: ICD-10-CM

## 2024-08-18 DIAGNOSIS — E78.5: ICD-10-CM

## 2024-08-18 DIAGNOSIS — D63.1: ICD-10-CM

## 2024-08-18 DIAGNOSIS — N39.0: ICD-10-CM

## 2024-08-18 DIAGNOSIS — N17.9: ICD-10-CM

## 2024-08-18 LAB
ALBUMIN SERPL BCP-MCNC: 3.2 G/DL (ref 3.4–5)
ALBUMIN/GLOB SERPL: 1 {RATIO} (ref 1.1–1.8)
ALP SERPL-CCNC: 89 U/L (ref 45–117)
ALT SERPL W P-5'-P-CCNC: 20 U/L (ref 16–61)
ANION GAP SERPL CALC-SCNC: 12.5 MEQ/L (ref 5–15)
AST SERPL W P-5'-P-CCNC: 18 U/L (ref 15–37)
BILIRUB INDIRECT SERPL-MCNC: 0.5 MG/DL (ref 0.2–0.8)
BUN BLD-MCNC: 31 MG/DL (ref 7–18)
GLOBULIN SER CALC-MCNC: 3.2 G/DL (ref 2.3–3.5)
GLUCOSE SERPLBLD-MCNC: 112 MG/DL (ref 74–106)
HCT VFR BLD CALC: 41.8 % (ref 39.6–49)
HGB BLD-MCNC: 13 G/DL (ref 13.6–17.9)
INR BLD: 1.44
LIPASE SERPL-CCNC: 35 U/L (ref 13–75)
LYMPHOCYTES # SPEC AUTO: 1 K/UL (ref 0.7–4.9)
MAGNESIUM SERPL-MCNC: 2 MG/DL (ref 1.6–2.4)
MCH RBC QN AUTO: 27 PG (ref 27–35)
MCHC RBC AUTO-ENTMCNC: 31.1 G/DL (ref 32–36)
MCV RBC: 86.8 FL (ref 80–100)
NRBC # BLD: 0 10*3/UL (ref 0–0)
NRBC BLD AUTO-RTO: 0.1 % (ref 0–0)
PMV BLD: 11.1 FL (ref 7.6–11.3)
POTASSIUM SERPL-SCNC: 4.5 MEQ/L (ref 3.5–5.1)
PROTHROMBIN TIME: 16 SECONDS (ref 9.4–12.5)
RBC # BLD: 4.82 M/UL (ref 4.33–5.43)
SQUAMOUS URNS QL MICRO: <5 /HPF
TROPONIN I SERPL HS-MCNC: 23.5 PG/ML (ref ?–58.9)
UA COMPLETE W REFLEX CULTURE PNL UR: (no result)
UA DIPSTICK W REFLEX MICRO PNL UR: (no result)
WBC # BLD AUTO: 5.1 THOU/UL (ref 4.3–10.9)

## 2024-08-18 PROCEDURE — 93306 TTE W/DOPPLER COMPLETE: CPT

## 2024-08-18 PROCEDURE — 76857 US EXAM PELVIC LIMITED: CPT

## 2024-08-18 PROCEDURE — 76770 US EXAM ABDO BACK WALL COMP: CPT

## 2024-08-18 PROCEDURE — 96375 TX/PRO/DX INJ NEW DRUG ADDON: CPT

## 2024-08-18 PROCEDURE — 84550 ASSAY OF BLOOD/URIC ACID: CPT

## 2024-08-18 PROCEDURE — 80061 LIPID PANEL: CPT

## 2024-08-18 PROCEDURE — 76377 3D RENDER W/INTRP POSTPROCES: CPT

## 2024-08-18 PROCEDURE — 71045 X-RAY EXAM CHEST 1 VIEW: CPT

## 2024-08-18 PROCEDURE — 93970 EXTREMITY STUDY: CPT

## 2024-08-18 PROCEDURE — 84439 ASSAY OF FREE THYROXINE: CPT

## 2024-08-18 PROCEDURE — 96374 THER/PROPH/DIAG INJ IV PUSH: CPT

## 2024-08-18 PROCEDURE — 82947 ASSAY GLUCOSE BLOOD QUANT: CPT

## 2024-08-18 PROCEDURE — 83735 ASSAY OF MAGNESIUM: CPT

## 2024-08-18 PROCEDURE — 85610 PROTHROMBIN TIME: CPT

## 2024-08-18 PROCEDURE — 81001 URINALYSIS AUTO W/SCOPE: CPT

## 2024-08-18 PROCEDURE — 93005 ELECTROCARDIOGRAM TRACING: CPT

## 2024-08-18 PROCEDURE — 86021 WBC ANTIBODY IDENTIFICATION: CPT

## 2024-08-18 PROCEDURE — 36415 COLL VENOUS BLD VENIPUNCTURE: CPT

## 2024-08-18 PROCEDURE — 99285 EMERGENCY DEPT VISIT HI MDM: CPT

## 2024-08-18 PROCEDURE — 80076 HEPATIC FUNCTION PANEL: CPT

## 2024-08-18 PROCEDURE — 87086 URINE CULTURE/COLONY COUNT: CPT

## 2024-08-18 PROCEDURE — 80048 BASIC METABOLIC PNL TOTAL CA: CPT

## 2024-08-18 PROCEDURE — 84443 ASSAY THYROID STIM HORMONE: CPT

## 2024-08-18 PROCEDURE — 87088 URINE BACTERIA CULTURE: CPT

## 2024-08-18 PROCEDURE — 83690 ASSAY OF LIPASE: CPT

## 2024-08-18 PROCEDURE — 97161 PT EVAL LOW COMPLEX 20 MIN: CPT

## 2024-08-18 PROCEDURE — 74176 CT ABD & PELVIS W/O CONTRAST: CPT

## 2024-08-18 PROCEDURE — 83880 ASSAY OF NATRIURETIC PEPTIDE: CPT

## 2024-08-18 PROCEDURE — 85025 COMPLETE CBC W/AUTO DIFF WBC: CPT

## 2024-08-18 PROCEDURE — 80053 COMPREHEN METABOLIC PANEL: CPT

## 2024-08-18 PROCEDURE — 84484 ASSAY OF TROPONIN QUANT: CPT

## 2024-08-18 RX ADMIN — APIXABAN SCH MG: 5 TABLET, FILM COATED ORAL at 21:39

## 2024-08-18 RX ADMIN — HYDRALAZINE HYDROCHLORIDE SCH MG: 25 TABLET, FILM COATED ORAL at 21:39

## 2024-08-18 RX ADMIN — ATORVASTATIN CALCIUM SCH MG: 40 TABLET, FILM COATED ORAL at 21:39

## 2024-08-18 RX ADMIN — HUMAN INSULIN SCH: 100 INJECTION, SOLUTION SUBCUTANEOUS at 21:00

## 2024-08-18 NOTE — RAD REPORT
EXAM DESCRIPTION:  US - Extrem Venous W Compress Devan - 8/18/2024 2:59 pm

 

CLINICAL HISTORY:  SWELLING

 

COMPARISON:  No comparisons

 

TECHNIQUE:  Real-time sonographic evaluation of the lower extremity deep venous systems was performed
 using color Doppler, grayscale, and compression.

 

FINDINGS:  Bilateral lower extremities.

 

Normal compressibility, flow augmentation, phasic flow and spontaneous flow is identified in both the
 left and right lower extremity deep venous systems. No intraluminal filling defects seen.

 

 

IMPRESSION:  No DVT in either lower extremity.

## 2024-08-18 NOTE — RAD REPORT
EXAM DESCRIPTION:  RAD - Chest Single View - 8/18/2024 2:17 pm

 

CLINICAL HISTORY:  DYSPNEA

 

COMPARISON:  Chest Single View dated 3/13/2023; Chest Single View dated 3/13/2023; Chest Single View 
dated 12/3/2022; Chest Single View dated 11/30/2022

 

FINDINGS:  Lines: Pacemaker.

Lungs: Mild pulmonary edema.

Pleural: No significant pleural effusions or pneumothorax.

Cardiac: Cardiomegaly.

Mediastinum: Within normal limits.

Bones: No acute fractures.

Other: None

 

IMPRESSION:  Mild pulmonary edema.

## 2024-08-18 NOTE — P.HP
Certification for Inpatient


Patient admitted to: Inpatient


With expected LOS: >2 Midnights


Patient will require the following post-hospital care: None


Practitioner: I am a practitioner with admitting privileges, knowledge of 

patient current condition, hospital course, and medical plan of care.


Services: Services provided to patient in accordance with Admission requirements

found in Title 42 Section 412.3 of the Code of Federal Regulations





Patient History


Date of Service: 08/18/24


Reason for admission: CHF exacerbation


History of Present Illness: 





71-year-old male with history of chronic systolic congestive heart failure, 

atrial fibrillation on chronic anticoagulation, hypertension, gout, CKD 3, 

hyperlipidemia presents emergency department for chief complaint of swelling in 

the lower extremities, shortness of breath.  He reports he has not taken his 

diuretics and around 2 months because he does not like how often makes him pee. 

He notes over the course of the last few weeks has been getting progressively 

more swollen and dyspneic.





Patient was evaluated in the emergency department as labs are significant for 

SAL, markedly elevated BNP initial high since troponin within normal limits.  

Chest x-ray shows mild CHF pattern, venous Doppler of lower extremities both 

negative CT abdomen pelvis without IV contrast was performed as well as revealed

anasarca, moderate bilateral pleural effusions. 





Patient will be admitted for CHF exacerbation


Allergies





No Known Allergies Allergy (Verified 04/26/12 17:47)


   





Home Medications: 








Apixaban [Eliquis] 1 tab PO BID 12/02/22 


Atorvastatin Calcium 1 tab PO BEDTIME 12/02/22 


Cyanocobalamin [Vitamin B-12*] 1 tab PO DAILY 12/02/22 


Furosemide [Lasix*] 1 tab PO BID 12/02/22 


Metformin HCl [Glucophage*] 1 tab PO BID 12/02/22 


Omeprazole 2 cap PO BID 12/02/22 


Thiamine HCl 1 tab PO DAILY 12/02/22 


Apixaban [Eliquis] 5 mg PO BID #60 tab 12/03/22 


Aspirin Chewable [Aspirin Chewable*] 81 mg PO DAILY #30 tab.chew 12/03/22 


Hydrocodone 5/APAP 325 [Norco 5/325*] 1 tab PO Q6H PRN #30 tab 12/03/22 


Metoprolol Tartrate [Lopressor*] 25 mg PO BID 6AM 6PM #60 tab 12/03/22 


Potassium Chloride 20 meq PO DAILY #30 tab 12/03/22 


metOLazone [Zaroxolyn*] 2.5 mg PO M,W,F #15 tab 12/03/22 








- Past Medical/Surgical History


Diabetic: Yes


-: DM2


-: GERD


-: HLD


-: Chronic systolic congestive heart failure


-: Hypertension


-: Gout


-: CKD 3


-: AICD insertion


-: Hip replacement x 4


Psychosocial/ Personal History: Retired, lives at home with his wife





- Family History


Family History: Reviewed- Non-Contributory





- Social History


Smoking Status: Former smoker


Alcohol use: Yes


CD- Drugs: No


Caffeine use: Yes


Place of Residence: Home





Review of Systems


10-point ROS is otherwise unremarkable


Respiratory: Shortness of Breath


Cardiovascular: Edema





Physical Examination





- Physical Exam


General: Alert, In no apparent distress, Oriented x3


HEENT: Atraumatic, PERRLA, Mucous membr. moist/pink


Neck: Supple, 2+ carotid pulse no bruit, No LAD


Respiratory: Diminished, Crackles/rales


Cardiovascular: Edema (3+ pitting edema bilateral lower extremity), Irregular 

heart rate/rhythm


Gastrointestinal: Normal bowel sounds, No tenderness


Musculoskeletal: No tenderness


Integumentary: No rashes


Neurological: Normal speech, Normal strength at 5/5 x4 extr, Normal tone, Normal

 affect





- Studies


Laboratory Data (last 24 hrs)











  08/18/24 08/18/24 08/18/24





  14:14 14:14 14:14


 


WBC   5.10 


 


Hgb   13.0 L 


 


Hct   41.8 


 


Plt Count   101 L 


 


PT  16.0 H  


 


INR  1.44  


 


Sodium    146 H


 


Potassium    4.5


 


BUN    31 H


 


Creatinine    2.22 H


 


Glucose    112 H


 


Magnesium    2.0


 


Total Bilirubin    0.8


 


AST    18


 


ALT    20


 


Alkaline Phosphatase    89


 


Lipase    35











Assessment and Plan





- Plan


Assessment:


Acute on chronic systolic congestive heart failure


Dyspnea, anasarca, pleural effusions


Atrial fibrillation on chronic anticoagulation


SAL on CKD 3 suspect CRS


Hypertension


Hyperlipidemia


Gout








Plan:


Acute on chronic systolic congestive heart failure


Dyspnea, anasarca, pleural effusions


Continue aggressive IV diuresis with Lasix-had been noncompliant with oral Lasix

 at home for a few months


Cardiology consultation, echocardiogram ordered


Telemetry, trend troponins


Heart healthy diet, strict I's and O's


Hold Entresto given SAL





Atrial fibrillation on chronic anticoagulation


Continue carvedilol, monitor telemetry


Continue Eliquis





SAL on CKD 3 suspect CRS


Continue aggressive diuresis, monitor renal function closely


Nephrology consultation and renal ultrasound ordered


Hold Entresto given SAL





Hypertension


Hyperlipidemia


Gout


Continue medications once verified





DVT PPX: Continue Eliquis


Code status: Full


Discharge Plan: Home


Plan to discharge in: Greater than 2 days





- Advance Directives


Does patient have a Living Will: No


Does patient have a Durable POA for Healthcare: Yes





- Code Status/Comfort Care


Code Status Assessed: Yes (Full code)


Critical Care: No


Time Spent Managing Pts Care (In Minutes): 62

## 2024-08-18 NOTE — ER
Nurse's Notes                                                                                     

 [facility]                                                                 

Name: Lucas Reyes Jr                                                                              

Age: 79 yrs                                                                                       

Sex: Male                                                                                         

: 1945                                                                                   

MRN: [id number]                                                                                   

Arrival Date: 2024                                                                          

Time: 13:55                                                                                       

Account#: [id number]                                                                            

Bed 17                                                                                            

Private MD:                                                                                       

Diagnosis: Chronic combined systolic (congestive) and diastolic (congestive) heart failure;Acute  

  kidney failure, unspecified-ON CHRONIC;Long term (current) use of                               

  anticoagulants;Persistent atrial fibrillation;Presence of cardiac pacemaker;UTI/                

  Urinary tract infection, site not specified                                                     

                                                                                                  

Presentation:                                                                                     

                                                                                             

14:00 Chief complaint: Patient states: sob increased over last week and bilat leg swelling.   db  

      states sent by caregiver to get checked out. has not been taking "water pill" due to        

      makes him "stay on the toilet all day". Coronavirus screen: Client denies travel out of     

      the [place] in the last 14 days. At this time, the client does not indicate any symptoms       

      associated with coronavirus-19. Ebola Screen: Patient negative for fever greater than       

      or equal to 101.5 degrees Fahrenheit, and additional compatible Ebola Virus Disease         

      symptoms Patient denies exposure to infectious person. Patient denies travel to an          

      Ebola-affected area in the 21 days before illness onset. No symptoms or risks               

      identified at this time. Initial Sepsis Screen: Does the patient meet any 2 criteria?       

      No. Patient's initial sepsis screen is negative. Does the patient have a suspected          

      source of infection? No. Patient's initial sepsis screen is negative. Risk Assessment:      

      Do you want to hurt yourself or someone else? Patient reports no desire to harm self or     

      others. Risk Assessment: Do you want to hurt yourself or someone else?. Onset of            

      symptoms was 2024.                                                               

14:00 Method Of Arrival: Wheelchair                                                           db  

14:00 Acuity: WINDY 2                                                                           db  

                                                                                                  

Triage Assessment:                                                                                

14:11 General: Appears in no apparent distress. comfortable, Behavior is calm, cooperative.   db  

      Pain: Denies pain. Neuro: Level of Consciousness is awake, alert, obeys commands,           

      Oriented to person, place, time, situation. Respiratory: Reports shortness of breath at     

      rest Airway is patent Respiratory effort is even, unlabored, Respiratory pattern is         

      regular, symmetrical, Onset: The symptoms/episode began/occurred gradually, the patient     

      has moderate shortness of breath.                                                           

                                                                                                  

Historical:                                                                                       

- Allergies:                                                                                      

14:11 No Known Allergies;                                                                     db  

- Home Meds:                                                                                      

14:11 Eliquis 5 mg oral tablet [Active];                                                      db  

- PMHx:                                                                                           

14:11 Anemia; diabetes mellitus; heart disease; Hypercholesterolemia; Hypertensive disorder;  db  

      RA; Stricture of esophagus;                                                                 

- PSHx:                                                                                           

14:11 Left Hip Sx;                                                                            db  

                                                                                                  

- Immunization history:: Adult Immunizations unknown.                                             

- Infectious Disease History:: Denies.                                                            

- Social history:: Smoking status: Patient denies any tobacco usage or history of.                

                                                                                                  

                                                                                                  

Screenin:23 [facility] ED Fall Risk Assessment (Adult) History of falling in the last 3 months,       go2 

      including since admission No falls in past 3 months (0 pts) Confusion or Disorientation     

      No (0 pts) Intoxicated or Sedated No (0 pts) Impaired Gait Yes (1 pt) Mobility Assist       

      Device Used Yes (1 pt) Altered Elimination No (0 pt) Score/Fall Risk Level 3 or more        

      points = High Risk.                                                                         

14:24 Abuse screen: Denies threats or abuse. Denies injuries from another. Nutritional        go2 

      screening: No deficits noted. Tuberculosis screening: No symptoms or risk factors           

      identified.                                                                                 

                                                                                                  

Assessment:                                                                                       

14:21 General: Appears in no apparent distress. Behavior is calm, cooperative, appropriate    go2 

      for age, Denies fever, fatigue, chills. Pain: Denies pain. Neuro: No deficits noted.        

      Cardiovascular: Reports shortness of breath, Denies chest pain, fatigue,                    

      lightheadedness, nausea, palpitations, syncope, vomiting, Heart tones S2 Capillary          

      refill < 3 seconds Patient's skin is warm and dry. Edema is 2+ to left midcalf, left        

      ankle, left foot, left toes, right midcalf, right ankle, right foot and right toes          

      Rhythm is                                                                                   

14:23 Respiratory: Reports shortness of breath Airway is patent Breath sounds with rales      go2 

      bilaterally. in left lower lobe, right lower lobe, left posterior lower lobe and right      

      posterior lower lobe.                                                                       

15:27 Reassessment: No changes from previously documented assessment. Patient is alert,       go2 

      oriented x 3, equal unlabored respirations, skin warm/dry/pink.                             

15:50 Reassessment: No changes from previously documented assessment. Patient is alert,       go2 

      oriented x 3, equal unlabored respirations, skin warm/dry/pink. Patient denies pain at      

      this time. Patient states feeling better. Patient states symptoms have improved.            

                                                                                                  

Vital Signs:                                                                                      

14:00  / 83; Pulse 108; Resp 34 S; Temp 97.7(O); Pulse Ox 99% on R/A; Weight 95.25 kg;  db  

      Height 6 ft. 0 in. ;                                                                        

15:01  / 74; Pulse 107; Resp 20; Temp 97.8; Pulse Ox 96% ;                              go2 

15:49  / 91; Pulse 100; Resp 20; Temp 98.4; Pulse Ox 97% ;                              go2 

16:42  / 95; Pulse 100; Resp 20; Temp 97.9; Pulse Ox 97% ;                              go2 

17:17  / 97; Pulse 100; Resp 20; Temp 98; Pulse Ox 97% ;                                go2 

14:00 Body Mass Index 28.48 (95.25 kg, 182.88 cm)                                             db  

                                                                                                  

ED Course:                                                                                        

13:59 Patient arrived in ED.                                                                  mg5 

14:01 Guru Rodriguez MD is Attending Physician.                                             [facility] 

14:04 Radha Gillespie, RN is Primary Nurse.                                                     go2 

14:11 Triage completed.                                                                       db  

14:14 Arm band placed on Patient placed in an exam room.                                      db  

14:19 XRAY Chest (1 view) In Process Unspecified.                                             EDMS

14:20 Patient has correct armband on for positive identification. Allergy band placed. Fall   go2 

      risk band placed. Bed in low position. Call light in reach. Side rails up X2. Adult w/      

      patient.                                                                                    

14:20 Inserted saline lock: 20 gauge in left forearm, using aseptic technique.                go2 

14:21 Lipase Sent.                                                                            go2 

14:21 Basic Metabolic Panel Sent.                                                             go2 

14:21 CBC with Diff Sent.                                                                     go2 

14:21 LFT's Sent.                                                                             go2 

14:21 Magnesium Sent.                                                                         go2 

14:21 NT PRO-BNP Sent.                                                                        go2 

14:21 PT-INR Sent.                                                                            go2 

14:21 Troponin HS Sent.                                                                       go2 

14:24 EKG done, by ED staff, reviewed by Guru Rodriguez MD.                                   [id number]

15:01 US Extremity Venous W Compression Devan In Process Unspecified.                           EDMS

15:02 Pillow given.                                                                           go2 

15:03 Urinalysis w/ reflexes Sent.                                                            go2 

16:04 Alen Rodríguez MD is Hospitalizing Provider.                                           luis 

16:15 Patient moved to CT via stretcher.                                                      go2 

16:26 CT Stone Protocol In Process Unspecified.                                               EDMS

16:37 Patient moved back from CT.                                                             go2 

                                                                                                  

Administered Medications:                                                                         

14:21 Drug: Furosemide IVP 40 mg IVP once; give over 2 minutes Route: IVP; Site: left forearm;go2 

16:56 Follow up: Response: No adverse reaction                                                go2 

16:41 Drug: Famotidine IVP 20 mg IVP once; dilute with 10 mL 0.9% NaCl; give over 2 minutes   go2 

      Route: IVP; Site: left forearm;                                                             

16:55 Follow up: Response: No adverse reaction                                                go2 

16:42 Drug: Rocephin IV 1 grams IV at per protocol once; Given slow IV push per pharmacy      go2 

      instructions Route: IV; Rate: per protocol; Site: left forearm;                             

16:56 Follow up: IV Status: Completed infusion                                                go2 

                                                                                                  

                                                                                                  

Medication:                                                                                       

14:23 VIS not applicable for this client.                                                     go2 

                                                                                                  

Output:                                                                                           

15:04 Urine: 100ml (Voided); Total: 100ml.                                                    go2 

15:49 Urine: 300ml (Voided); Total: 400ml.                                                    go2 

17:28 Urine: 500ml (Voided); Total: 900ml.                                                    go2 

                                                                                                  

Outcome:                                                                                          

16:05 Decision to Hospitalize by Provider.                                                    luis 

18:15 Patient left the ED.                                                                    go2 

                                                                                                  

Signatures:                                                                                       

Dispatcher [employer]                           EDMS                                                 

Guru Rodriguez MD MD cha Benton, Danielle, RN                    RN   Rupinder Lucero                             5                                                  

Cielo Echevarria                                [facility]                                                 

Radha Gillespie RN RN   go2                                                  

                                                                                                  

**************************************************************************************************

## 2024-08-18 NOTE — EDPHYS
Physician Documentation                                                                           

 [facility]                                                                 

Name: Lucas Reyes Jr                                                                              

Age: 79 yrs                                                                                       

Sex: Male                                                                                         

: 1945                                                                                   

MRN: [id number]                                                                                   

Arrival Date: 2024                                                                          

Time: 13:55                                                                                       

Account#: [id number]                                                                            

Bed 17                                                                                            

Private MD:                                                                                       

ED Physician Gruu Rodriguez                                                                      

HPI:                                                                                              

                                                                                             

15:52 This 79 yrs old  Male presents to ER via Wheelchair with complaints of          luis 

      Breathing Difficulty, Leg Swelling.                                                         

15:52 The patient has shortness of breath at rest, with light activity. Onset: The            luis 

      symptoms/episode began/occurred 2 day(s) ago. Duration: The symptoms are continuous,        

      and are steadily getting worse. The patient's shortness of breath is aggravated by          

      coughing, supine position, walking, is alleviated by rest, sitting up, application of       

      supplemental oxygen. Associated signs and symptoms: Pertinent positives: non-productive     

      cough. Severity of symptoms: At their worst the symptoms were moderate in the emergency     

      department the symptoms have improved mildly. The patient has experienced similar           

      episodes in the past, multiple times.                                                       

                                                                                                  

Historical:                                                                                       

- Allergies:                                                                                      

14:11 No Known Allergies;                                                                     db  

- Home Meds:                                                                                      

14:11 Eliquis 5 mg oral tablet [Active];                                                      db  

- PMHx:                                                                                           

14:11 Anemia; diabetes mellitus; heart disease; Hypercholesterolemia; Hypertensive disorder;  db  

      RA; Stricture of esophagus;                                                                 

- PSHx:                                                                                           

14:11 Left Hip Sx;                                                                            db  

                                                                                                  

- Immunization history:: Adult Immunizations unknown.                                             

- Infectious Disease History:: Denies.                                                            

- Social history:: Smoking status: Patient denies any tobacco usage or history of.                

                                                                                                  

                                                                                                  

ROS:                                                                                              

15:56 Constitutional: Negative for fever, chills, and weight loss, Eyes: Negative for injury, luis 

      pain, redness, and discharge, ENT: Negative for injury, pain, and discharge, Neck:          

      Negative for injury, pain, and swelling, Abdomen/GI: Negative for abdominal pain,           

      nausea, vomiting, diarrhea, and constipation, Back: Negative for injury and pain, :       

      Negative for injury, bleeding, discharge, and swelling, Skin: Negative for injury,          

      rash, and discoloration, Neuro: Negative for headache, weakness, numbness, tingling,        

      and seizure, Psych: Negative for depression, anxiety, suicide ideation, homicidal           

      ideation, and hallucinations, Allergy/Immunology: Negative for hives, rash, and             

      allergies, Endocrine: Negative for neck swelling, polydipsia, polyuria, polyphagia, and     

      marked weight changes, Hematologic/Lymphatic: Negative for swollen nodes, abnormal          

      bleeding, and unusual bruising,                                                             

15:56 Cardiovascular: Positive for orthopnea, palpitations,                                       

15:56 MS/extremity: Positive for pain, swelling, of the right leg and left leg,                   

                                                                                                  

Exam:                                                                                             

15:56 Constitutional:  This is a well developed, well nourished patient who is awake, alert,  luis 

      and in no acute distress. Head/Face:  Normocephalic, atraumatic. Eyes:  Pupils equal        

      round and reactive to light, extra-ocular motions intact.  Lids and lashes normal.          

      Conjunctiva and sclera are non-icteric and not injected.  Cornea within normal limits.      

      Periorbital areas with no swelling, redness, or edema. ENT:  Nares patent. No nasal         

      discharge, no septal abnormalities noted.  Tympanic membranes are normal and external       

      auditory canals are clear.  Oropharynx with no redness, swelling, or masses, exudates,      

      or evidence of obstruction, uvula midline.  Mucous membranes moist. Neck:  Trachea          

      midline, no thyromegaly or masses palpated, and no cervical lymphadenopathy.  Supple,       

      full range of motion without nuchal rigidity, or vertebral point tenderness.  No            

      Meningismus. Chest/axilla:  Normal chest wall appearance and motion.  Nontender with no     

      deformity.  No lesions are appreciated. Cardiovascular:  Regular rate and rhythm with a     

      normal S1 and S2.  No gallops, murmurs, or rubs.  Normal PMI, no JVD.  No pulse             

      deficits. Abdomen/GI:  Soft, non-tender, with normal bowel sounds.  No distension or        

      tympany.  No guarding or rebound.  No evidence of tenderness throughout. Back:  No          

      spinal tenderness.  No costovertebral tenderness.  Full range of motion. Male :           

      Normal genitalia with no discharge or lesions. Skin:  Warm, dry with normal turgor.         

      Normal color with no rashes, no lesions, and no evidence of cellulitis. Psych:  Awake,      

      alert, with orientation to person, place and time.  Behavior, mood, and affect are          

      within normal limits.                                                                       

15:56 ECG was reviewed by the Attending Physician.                                                

15:56 Respiratory: the patient does not display signs of respiratory distress,  Respirations:     

      labored breathing, that is mild, Breath sounds: rales, that are moderate, are located       

      in both bases, are heard in the  left posterior lower lobe, right posterior middle lobe     

      and right posterior lower lobe, Respiratory rate:  20                                       

15:56 Musculoskeletal/extremity: ROM: intact in all extremities, Circulation is intact in all     

      extremities. Sensation intact. Compartment Syndrome exam of affected extremity: is          

      normal. Joints: All joints appear normal with full range of motion. DVT Exam: no pain,      

      no tenderness, negative Homans' sign noted on exam, no appreciated bluish                   

      discoloration, no erythema, no increased warmth, swelling,                                  

                                                                                                  

Vital Signs:                                                                                      

14:00  / 83; Pulse 108; Resp 34 S; Temp 97.7(O); Pulse Ox 99% on R/A; Weight 95.25 kg;  db  

      Height 6 ft. 0 in. ;                                                                        

15:01  / 74; Pulse 107; Resp 20; Temp 97.8; Pulse Ox 96% ;                              go2 

15:49  / 91; Pulse 100; Resp 20; Temp 98.4; Pulse Ox 97% ;                              go2 

16:42  / 95; Pulse 100; Resp 20; Temp 97.9; Pulse Ox 97% ;                              go2 

17:17  / 97; Pulse 100; Resp 20; Temp 98; Pulse Ox 97% ;                                go2 

14:00 Body Mass Index 28.48 (95.25 kg, 182.88 cm)                                             db  

                                                                                                  

MDM:                                                                                              

14:01 Patient medically screened.                                                             luis 

16:06 Differential diagnosis: contusion, abrasion, tendonitis, Anemia Anxiety Reaction        luis 

      asthma, CHF exacerbation, Chronic Obstructive Pulmonary Disease pneumonia, Pneumothorax     

      pulmonary edema, Pulmonary Embolism reactive airway disease, Sepsis Unstable Angina.        

      Antibiotic administration: Rocephin and Zithromax given. Immunization status:               

      Pneumococcal vaccine: within last 5 years. Influenza vaccine: within last 5 years. Data     

      reviewed: vital signs, nurses notes, EMS record, lab test result(s), EKG, radiologic        

      studies, plain films. Consideration of Admission/Observation Patient was                    

      admitted/placed on observation. Escalation of care including admission/observation          

      considered. I considered the following discharge prescriptions or medication management     

      in the emergency department Medications were administered in the Emergency Department.      

      See MAR. Independent interpretation of the following test(s) in the Emergency               

      Department EKG: See my EKG interpretation above. Care significantly affected by the         

      following chronic conditions: Diabetes, Hypertension, Congestive Heart Failure,             

      Obesity, Chronic Kidney Disease. Counseling: I had a detailed discussion with the           

      patient and/or guardian regarding the historical points, exam findings, and any             

      diagnostic results supporting the discharge/admit diagnosis, the presence of at least       

      one elevated blood pressure reading (>120/80) during this emergency department visit,       

      lab results, radiology results, the need for further work-up and treatment in the           

      hospital.                                                                                   

                                                                                                  

                                                                                             

14:04 Order name: Basic Metabolic Panel; Complete Time: 15:46                                 [facility] 

                                                                                             

14:04 Order name: CBC with Diff; Complete Time: 15:46                                         [facility] 

                                                                                             

14:04 Order name: LFT's; Complete Time: 15:46                                                 [facility] 

                                                                                             

14:04 Order name: Magnesium; Complete Time: 15:46                                             [facility] 

                                                                                             

14:04 Order name: NT PRO-BNP; Complete Time: 15:46                                            [facility] 

                                                                                             

14:04 Order name: PT-INR; Complete Time: 15:46                                                [facility] 

                                                                                             

14:04 Order name: Troponin HS; Complete Time: 15:46                                           [facility] 

                                                                                             

14:04 Order name: Urinalysis w/ reflexes; Complete Time: 15:46                                [facility] 

                                                                                             

14:04 Order name: Lipase; Complete Time: 15:46                                                [facility] 

                                                                                             

15:37 Order name: Urine Culture                                                               [facility]

                                                                                             

14:04 Order name: XRAY Chest (1 view); Complete Time: 15:46                                   [facility] 

                                                                                             

14:04 Order name: US Extremity Venous W Compression Devan; Complete Time: 15:46                 [facility] 

                                                                                             

15:51 Order name: CT Stone Protocol; Complete Time: 17:10                                     [facility] 

                                                                                             

14:04 Order name: Cardiac monitoring; Complete Time: 14:21                                    [facility] 

                                                                                             

14:04 Order name: EKG - Nurse/Tech; Complete Time: 14:21                                      [facility] 

                                                                                             

14:04 Order name: IV Saline Lock; Complete Time: 14:21                                        [facility] 

                                                                                             

14:04 Order name: Labs collected and sent; Complete Time: 14:21                               [facility] 

                                                                                             

14:04 Order name: O2 Per Protocol; Complete Time: 14:23                                       [facility] 

                                                                                             

14:04 Order name: O2 Sat Monitoring; Complete Time: 14:21                                     [facility] 

                                                                                             

14:04 Order name: IV - Large Bore; Complete Time: 14:21                                       [facility] 

                                                                                                  

ECG:                                                                                              

15:56 Rate is 104 beats/min. Rhythm is irregularly irregular, A fib with Ventricular paced.   luis 

      QRS Axis is Normal. NM interval is normal. QRS interval is normal. QT interval is           

      normal. No Q waves. T waves are Normal. No ST changes noted. Clinical impression:           

      Atrial Fibrillation. Interpreted by me. Reviewed by me.                                     

                                                                                                  

Administered Medications:                                                                         

14:21 Drug: Furosemide IVP 40 mg IVP once; give over 2 minutes Route: IVP; Site: left forearm;go2 

16:56 Follow up: Response: No adverse reaction                                                go2 

16:41 Drug: Famotidine IVP 20 mg IVP once; dilute with 10 mL 0.9% NaCl; give over 2 minutes   go2 

      Route: IVP; Site: left forearm;                                                             

16:55 Follow up: Response: No adverse reaction                                                go2 

16:42 Drug: Rocephin IV 1 grams IV at per protocol once; Given slow IV push per pharmacy      go2 

      instructions Route: IV; Rate: per protocol; Site: left forearm;                             

16:56 Follow up: IV Status: Completed infusion                                                go2 

                                                                                                  

                                                                                                  

Disposition:                                                                                      

16:07 Critical Care:.                                                                         luis 

                                                                                                  

Disposition Summary:                                                                              

24 16:05                                                                                    

Hospitalization Ordered                                                                           

 Notes:       Hospitalization Status: Inpatient Admission                                           
  luis

      Provider: Alen Rodríguez cha 

      Location: Tele[facility] (Inpatient)                                                 luis 

      Condition: Fair                                                                         luis 

      Problem: new                                                                            luis 

      Symptoms: have improved                                                                 luis 

      Bed/Room Type: Standard                                                                 luis 

      Room Assignment: 215(24 17:18)                                                    eb  

      Diagnosis                                                                                   

        - Chronic combined systolic (congestive) and diastolic (congestive) heart failure     luis 

        - Acute kidney failure, unspecified - ON CHRONIC                                      luis 

        - Long term (current) use of anticoagulants                                           luis 

        - Persistent atrial fibrillation                                                      luis 

        - Presence of cardiac pacemaker                                                       luis 

        - UTI/ Urinary tract infection, site not specified                                    luis 

      Forms:                                                                                      

        - Medication Reconciliation Form                                                      luis 

        - SBAR form                                                                           luis 

        - Leadership Thank You Letter                                                         luis 

Critical care time excluding procedures:                                                          

16:07 Critical care time: Bedside Care: 30 minutes, Consultation: 15 minutes, Family          luis 

      Intervention: 10 minutes. Total time: 55 minutes                                            

                                                                                                  

Signatures:                                                                                       

Dispatcher [employer]                           EDGuru Dominguez MD MD cha Attema, Lee, EDILP-C                      FNP-Cla1                                                  

Nohelia Young Danielle, RN                    RN   db                                                   

Radha Gillespie RN                       RN   go2                                                  

                                                                                                  

Corrections: (The following items were deleted from the chart)                                    

14:05 14:05 Extrem Venous W Compression Devan+US.RAD.BRZ ordered. EDMS                          EDMS

15:54 15:51 Pulido ordered. luis                                                                go2 

17:18 16:05 luis                                                                               eb  

                                                                                                  

**************************************************************************************************

## 2024-08-18 NOTE — RAD REPORT
EXAM DESCRIPTION:  [facility] Protocol - 8/18/2024 4:24 pm

 

CLINICAL HISTORY:

FLANK PAIN

 

COMPARISON:  No comparisons

 

TECHNIQUE:  CT of the abdomen and pelvis was performed without contrast.

 

All CT scans are performed using dose optimization technique as appropriate and may include automated
 exposure control or mA/KV adjustment according to patient size.

 

FINDINGS:  Lower chest: Cardiomegaly. Moderate pleural effusions. Atelectasis as result of the fluid.


Liver: Cirrhotic liver morphology.

Biliary: Cholelithiasis. Mild gallbladder wall thickening which is nonspecific.

Stomach: No significant focal abnormality.

Duodenum: No significant focal abnormality.

Pancreas: No significant abnormality.

Spleen: No significant abnormality.

Adrenal: No suspicious lesions.

Kidney/ureter: No hydronephrosis. 5 mm stone lower pole right kidney. Too small to characterize and/o
r benign appearing renal lesions are noted.

Retroperitoneum: No retroperitoneal adenopathy.

Vascular: No aneurysm. Atherosclerosis .

Bowel: No significant focal abnormality.

Peritoneum: No ascites or free air. Fat containing inguinal hernias. Prior ventral hernia repair.

Bladder: Nonspecific bladder wall thickening.

Reproductive: Question prior prostatectomy and.

Bones: No acute fracture. Bridging osteophytes in the spine. Left hip arthroplasty.

Other: Body wall edema.

 

IMPRESSION:  No acute intra-abdominal or pelvic finding.

 

Gallbladder wall thickening likely related to underlying heart failure/anasarca.

 

Anasarca including moderate bilateral effusions and likely underlying atelectasis.

 

Nonspecific bladder wall thickening. Correlate with urinalysis.

## 2024-08-19 VITALS — OXYGEN SATURATION: 99 %

## 2024-08-19 LAB
ALBUMIN SERPL BCP-MCNC: 3.2 G/DL (ref 3.4–5)
ALBUMIN/GLOB SERPL: 1 {RATIO} (ref 1.1–1.8)
ALP SERPL-CCNC: 78 U/L (ref 45–117)
ALT SERPL W P-5'-P-CCNC: 18 U/L (ref 16–61)
ANION GAP SERPL CALC-SCNC: 13.6 MEQ/L (ref 5–15)
ANISOCYTOSIS BLD QL: SLIGHT
AST SERPL W P-5'-P-CCNC: 15 U/L (ref 15–37)
BLD SMEAR INTERP: (no result)
BUN BLD-MCNC: 33 MG/DL (ref 7–18)
GLOBULIN SER CALC-MCNC: 3.1 G/DL (ref 2.3–3.5)
GLUCOSE SERPLBLD-MCNC: 128 MG/DL (ref 74–106)
HCT VFR BLD CALC: 41.1 % (ref 39.6–49)
HDLC SERPL-MCNC: 52 MG/DL (ref 40–60)
HGB BLD-MCNC: 13.1 G/DL (ref 13.6–17.9)
LDLC SERPL CALC-MCNC: 30 MG/DL (ref ?–130)
LDLC SERPL-MCNC: 30 MG/DL
LYMPHOCYTES # SPEC AUTO: 1 K/UL (ref 0.7–4.9)
MAGNESIUM SERPL-MCNC: 2 MG/DL (ref 1.6–2.4)
MCH RBC QN AUTO: 27.3 PG (ref 27–35)
MCHC RBC AUTO-ENTMCNC: 31.9 G/DL (ref 32–36)
MCV RBC: 85.6 FL (ref 80–100)
MICROCYTES BLD QL SMEAR: SLIGHT
MORPHOLOGY BLD-IMP: (no result)
NRBC # BLD: 0 10*3/UL (ref 0–0)
NRBC BLD AUTO-RTO: 0.3 % (ref 0–0)
PMV BLD: 11.3 FL (ref 7.6–11.3)
POLYCHROMASIA BLD QL SMEAR: SLIGHT
POTASSIUM SERPL-SCNC: 4.6 MEQ/L (ref 3.5–5.1)
RBC # BLD: 4.8 M/UL (ref 4.33–5.43)
TROPONIN I SERPL HS-MCNC: 21.2 PG/ML (ref ?–58.9)
TSH SERPL DL<=0.05 MIU/L-ACNC: 1.36 UIU/ML (ref 0.36–3.74)
WBC # BLD AUTO: 4.1 THOU/UL (ref 4.3–10.9)

## 2024-08-19 RX ADMIN — PANTOPRAZOLE SODIUM SCH MG: 40 TABLET, DELAYED RELEASE ORAL at 16:34

## 2024-08-19 RX ADMIN — ATORVASTATIN CALCIUM SCH MG: 40 TABLET, FILM COATED ORAL at 20:11

## 2024-08-19 RX ADMIN — APIXABAN SCH MG: 5 TABLET, FILM COATED ORAL at 20:11

## 2024-08-19 RX ADMIN — ALBUMIN (HUMAN) SCH MG: 5 SOLUTION INTRAVENOUS at 01:48

## 2024-08-19 NOTE — P.CNS
Date of Consult: 08/19/24


Chief Complaint: CHF exacerbation


History of Present Illness: 





Patient with PMH of HFrEF, Atrial fibrillation, CKD presented with worsening SOB

and bilateral lower extremities edema, has not been that complaint with 

medications, denies chest pain, no palpitations, no syncope.


Allergies





No Known Allergies Allergy (Verified 08/18/24 23:12)


   





Home Medications: 








Atorvastatin Calcium 1 tab PO BEDTIME 12/02/22 


Cyanocobalamin [Vitamin B-12*] 1 tab PO DAILY 12/02/22 


Furosemide [Lasix*] 1 tab PO BID 12/02/22 


Metformin HCl [Glucophage*] 1 tab PO BID 12/02/22 


Omeprazole 2 cap PO BID 12/02/22 


Thiamine HCl 1 tab PO DAILY 12/02/22 


Apixaban [Eliquis] 5 mg PO BID #60 tab 12/03/22 


Aspirin Chewable [Aspirin Chewable*] 81 mg PO DAILY #30 tab.chew 12/03/22 


Metoprolol Tartrate [Lopressor*] 25 mg PO BID 6AM 6PM #60 tab 12/03/22 


Potassium Chloride 20 meq PO DAILY #30 tab 12/03/22 


metOLazone [Zaroxolyn*] 2.5 mg PO M,W,F #15 tab 12/03/22 








- Past Medical/Surgical History


Diabetic: Yes


-: DM2


-: GERD


-: HLD


-: Chronic systolic congestive heart failure


-: Hypertension


-: Gout


-: CKD 3


-: AICD insertion


-: Hip replacement x 4


-: defbrillator


Psychosocial/ Personal History: Retired, lives at home with his wife





- Family History


  ** Mother


Medical History: Cancer





- Social History


Smoking Status: Former smoker


Alcohol use: No


CD- Drugs: No


Caffeine use: Yes


Place of Residence: Home





Review of Systems


10-point ROS is otherwise unremarkable





Physical Examination














Temp Pulse Resp BP Pulse Ox


 


 97.2 F   85   16   136/89   97 


 


 08/19/24 16:00  08/19/24 16:00  08/19/24 16:00  08/19/24 16:00  08/19/24 16:00








General: Alert, In no apparent distress


HEENT: Atraumatic, PERRLA, Mucous membr. moist/pink, EOMI, Sclerae nonicteric


Neck: Supple, 2+ carotid pulse no bruit, No LAD, Without JVD or thyroid 

abnormality


Respiratory: Crackles/rales


Cardiovascular: Regular rate/rhythm, Normal S1 S2, Edema


Gastrointestinal: Normal bowel sounds, No tenderness


Musculoskeletal: No tenderness


Integumentary: No rashes


Neurological: Normal gait, Normal speech, Normal tone, Normal affect


Lymphatics: No axilla or inguinal lymphadenopathy





- Problems


(1) Acute on chronic combined systolic and diastolic heart failure


Current Visit: Yes   Status: Acute   


Plan: 


Patient is NYHA 4, stage C.


Continue IV Lasix 40 mg TID,


continue Coreg 12.5 mg po BID


Continue Hydralazine


Monitor input and output and electrolytes


Echo shows severe reduced LV function with severe global hypokinesis and can not

 rule out LV thombus with severe pulmonary hypertension and elevated filling 

pressures








(2) Atrial fibrillation


Current Visit: Yes   Status: Acute   


Plan: 


continue coreg and Eliquis











(3) HTN (hypertension)


Current Visit: Yes   Status: Acute   


Plan: 


continue medications as above.

## 2024-08-19 NOTE — P.PN
Date of Service: 08/19/24





Subjective:


Still reports dyspnea but slightly improving


No other acute events overnight





ROS: 


10 point ROS as noted above, otherwise negative








General: Alert, In no apparent distress, Oriented x3


HEENT: Atraumatic, PERRLA, Mucous membr. moist/pink


Neck: Supple, 2+ carotid pulse no bruit, No LAD


Respiratory: Diminished, Crackles/rales


Cardiovascular: Edema (3+ pitting edema bilateral lower extremity), Irregular 

heart rate/rhythm


Gastrointestinal: Normal bowel sounds, No tenderness


Musculoskeletal: No tenderness


Integumentary: No rashes


Neurological: Normal speech, Normal strength at 5/5 x4 extr, Normal tone, Normal

affect











Assessment:


Acute on chronic systolic congestive heart failure


Dyspnea, anasarca, pleural effusions


Atrial fibrillation on chronic anticoagulation


SAL on CKD 3 suspect CRS


Hypertension


Hyperlipidemia


Gout








Plan:


Acute on chronic systolic congestive heart failure


Dyspnea, anasarca, pleural effusions


Continue aggressive IV diuresis with Lasix-had been noncompliant with oral Lasix

at home for a few months


Cardiology consultation, echocardiogram ordered


Continue to monitor on telemetry, troponins trended flat


Heart healthy diet, strict I's and O's


Hold Entresto given SAL


Consider PT consultation after patient is less dyspneic/better diuresed





Atrial fibrillation on chronic anticoagulation


Continue carvedilol, monitor on telemetry


Continue Eliquis





SAL on CKD 3 suspect CRS


Continue aggressive diuresis, renal function slightly improved so far


Nephrology consultation and renal ultrasound ordered


Hold Entresto given SAL





Hypertension


Hyperlipidemia


Gout


Continue medications once verified





DVT PPX: Continue Eliquis


Code status: Full


Discharge Plan: Home


Plan to discharge in: Greater than 2 days





Time Spent Managing Pts Care (In Minutes): 35

## 2024-08-20 LAB
ALBUMIN SERPL BCP-MCNC: 3.2 G/DL (ref 3.4–5)
ALBUMIN/GLOB SERPL: 1.1 {RATIO} (ref 1.1–1.8)
ALP SERPL-CCNC: 82 U/L (ref 45–117)
ALT SERPL W P-5'-P-CCNC: 17 U/L (ref 16–61)
ANION GAP SERPL CALC-SCNC: 11.9 MEQ/L (ref 5–15)
AST SERPL W P-5'-P-CCNC: 14 U/L (ref 15–37)
BUN BLD-MCNC: 31 MG/DL (ref 7–18)
GLOBULIN SER CALC-MCNC: 2.8 G/DL (ref 2.3–3.5)
GLUCOSE SERPLBLD-MCNC: 135 MG/DL (ref 74–106)
HCT VFR BLD CALC: 41.9 % (ref 39.6–49)
HGB BLD-MCNC: 13.3 G/DL (ref 13.6–17.9)
LYMPHOCYTES # SPEC AUTO: 0.9 K/UL (ref 0.7–4.9)
MAGNESIUM SERPL-MCNC: 1.8 MG/DL (ref 1.6–2.4)
MCH RBC QN AUTO: 27.3 PG (ref 27–35)
MCHC RBC AUTO-ENTMCNC: 31.7 G/DL (ref 32–36)
MCV RBC: 86.1 FL (ref 80–100)
NRBC # BLD: 0 10*3/UL (ref 0–0)
NRBC BLD AUTO-RTO: 0 % (ref 0–0)
PMV BLD: 11.3 FL (ref 7.6–11.3)
POTASSIUM SERPL-SCNC: 3.9 MEQ/L (ref 3.5–5.1)
RBC # BLD: 4.87 M/UL (ref 4.33–5.43)
URATE SERPL-MCNC: 9.3 MG/DL (ref 3.5–7.2)
WBC # BLD AUTO: 4.1 THOU/UL (ref 4.3–10.9)

## 2024-08-20 RX ADMIN — ASPIRIN 81 MG SCH MG: 81 TABLET ORAL at 09:17

## 2024-08-20 RX ADMIN — Medication SCH MG: at 09:17

## 2024-08-20 RX ADMIN — CYANOCOBALAMIN TAB 1000 MCG SCH MCG: 1000 TAB at 09:17

## 2024-08-20 RX ADMIN — ALBUMIN (HUMAN) SCH MG: 5 SOLUTION INTRAVENOUS at 21:52

## 2024-08-20 NOTE — P.PN
Date of Service: 08/20/24





Subjective:


Awake and conversing well


does not want to walk because he has not slept well


Reports his wife brought him to the ED because he is having trouble sleeping. 











ROS: 


10 point ROS as noted above, otherwise negative








General: Alert and Oriented x3, NAD


HEENT: Atraumatic, PERRLA, Mucous membr. moist/pink


Neck: Supple, 2+ carotid pulse no bruit, No LAD


Respiratory: Diminished, Crackles/rales


Cardiovascular: Edema (3+ pitting edema bilateral lower extremity), Paced rhythm

HR 84


Gastrointestinal: Normal bowel sounds, NT/ND, soft on palpation


Musculoskeletal: No tenderness


Integumentary: No rashes


Neurological: Normal speech, Normal strength at 5/5 x4 extr, Normal tone, Normal

affect











Assessment:


Acute on chronic systolic congestive heart failure


Dyspnea, anasarca, pleural effusions


Atrial fibrillation on chronic anticoagulation


SAL on CKD 3 suspect CRS


Hypertension


Hyperlipidemia


Gout








Plan:


Acute on chronic systolic congestive heart failure


Dyspnea, anasarca, pleural effusions


-Continue aggressive IV diuresis with Lasix-had been noncompliant with oral 

Lasix at home for a few months


-Cardiology consultation, echocardiogram reports EF 10-15%, Severe pulmonary 

hypertension, mild tricuspid regurgitation, severe global hypokinesis with 

akinetic/aneurysmal apex cannot exclude left ventricular apex thrombus.


-Continue to monitor on telemetry, troponins trended flat


-Heart healthy diet, strict I's and O's, UOP 2300


-Hold Entresto given SAL


-Consider PT consultation after patient is less dyspneic/better diuresed





Atrial fibrillation on chronic anticoagulation


-Continue carvedilol, monitor on telemetry


-Continue Eliquis





SAL on CKD 3 suspect CRS


-Continue aggressive diuresis, renal function slightly improved so far


-Nephrology consultation  


-renal ultrasound reports Increased echogenicity of the kidneys bilaterally 

likely reflecting medical renal disease. No hydronephrosis.


-Hold Entresto given SAL





Hypertension


Hyperlipidemia


Gout


-Uric acid 9.3


-Continue medications once verified





DVT PPX: Continue Eliquis


Code status: Full


Discharge Plan: Home


Plan to discharge in: Greater than 2 days





<Rosa Maria Freedman - Last Filed: 08/20/24 18:26>


Patient seen and examined.  Plan of care discussed with Ms. Freedman.


Patient clinically improved.


Leg edema significantly improved, he is not orthopnea.





Plan:


Continue IV Lasix


Echocardiogram results reviewed and noted severe LV dysfunction and severe 

pulmonary hypertension.


Cardiology is following.


Awaiting PT evaluation.








<susie ferrer - Last Filed: 08/22/24 16:31>

## 2024-08-20 NOTE — PN
Date of Progress Note:  08/20/2024



Subjective:  No overnight event.  Creatinine is slightly improved.  Edema is significantly improved. 
 We will consider to switch to Lasix to p.o. tomorrow.



Objective:  Vital Signs:  Temperature 97, pulse rate 85, blood pressure 157/98. 

General:  Awake and alert, not in distress. 

Neck:  Supple.  No elevated JVD. 

Heart:  Regular rate and rhythm.  Normal S1, S2. 

Chest:  Clear to auscultation bilaterally.  No rales or wheezes. 

Abdomen:  Soft, nontender. 

Extremities:  Edema, left more than the right.



Laboratory Data:  White count 4.1, hemoglobin 13.3, platelets 79.  Sodium 143, potassium 3.9, BUN 31,
 creatinine 2.



Assessment And Plan:  This is a patient with chronic kidney disease.  Creatinine 1.4 in 2012 and 1.8 
in 2013.  History of systolic congestive heart failure and atrial fibrillation, who presented for ashley
rtness of breath.  Nephrology consulted for acute kidney injury.

1.Acute on chronic kidney disease.  Creatinine 1.5 in 2022 and 1.8 in 2023, and now creatinine is 2 
and improving.  Continue Lasix.  Avoid NSAID and contrast.  Renal dose medication.

2.Congestive heart failure exacerbation.  Continue diuretic.  We will reduce Lasix to IV b.i.d. toda
y and can switch to oral tomorrow.  The patient can benefit from SGLT2 after discharge.

3.Atrial fibrillation, currently rate controlled on Coreg and Eliquis.

4.Hyperlipidemia.  Continue Lipitor.

5.Hypertension.  Currently, blood pressure is controlled.  The patient is on hydralazine and Coreg. 




Thank you for allowing me to participate in the patient's care. 



Total time spent 55 minutes including documentation, reviewing labs, and discussing with the patient 
at the bedside.





MIK/TEGAN

DD:  08/20/2024 16:13:09Voice ID:  [id number]

DT:  08/20/2024 21:26:20Report ID:  [id number]

## 2024-08-20 NOTE — CON
Date of Consultation:  08/19/2024



Chief Complaint:  Acute on chronic kidney injury.



History Of Present Illness:  The patient is admitted to the hospital because of 
congestive heart failure exacerbation.  Nephrology consultation is requested for
acute kidney injury.  The patient has history of cardiorenal syndrome.  He is a 
79-year-old man with history of chronic systolic congestive heart failure; 
atrial fibrillation, on chronic anticoagulation; hypertension; gout; chronic 
kidney disease stage 3; hyperlipidemia.  He presented to the emergency 
department and was admitted on August 18, 2024 to the hospital because of 
dyspnea, swelling in the lower extremities, PND, orthopnea.  He was taking 
diuretic prior to this admission, although he stopped taking diuretic several 
weeks ago because he did not like how often he was urinating, and he was 
concerned that he may be dehydrated with diuretic treatment.  The patient 
developed shortness of breath, dyspnea on exertion, and some PND as well as 
progressively worse lower extremity edema.  When he presented to emergency room,
lab work showed elevated BUN and creatinine.  Chest x-ray showed CHF pattern, 
and venous Doppler of lower extremity was negative for DVT.  CT scan of the 
abdomen and pelvis was done without IV contrast and showed anasarca, moderate 
bilateral pleural effusions.  Lab work revealed elevated BNP, and troponin level
was within normal limits.  The patient is admitted for congestive heart failure 
exacerbation, and Nephrology consultation is requested for acute on chronic 
kidney injury.  The patient has history of diabetes mellitus, diabetic kidney 
disease.  Prior to this admission, he was taking metformin.  It is not clear if 
the patient was taking metformin; apparently, previously he was taken off 
metformin.



Past Medical History:  Diabetes mellitus with renal manifestation, chronic 
systolic congestive heart failure, GERD, hyperlipidemia, gout, chronic kidney 
disease stage 3, AICD insertion, hip replacement.



Family History:  Noncontributory.



Social History:  Former smoker.  Drinks alcohol.  Denies drugs.



Review of Systems:

Constitutional:  Denies fever, chills. 

Eyes:  Denies vision changes. 

Ears, Nose, Mouth, and Throat:  Denies sore throat, earache. 

Respiratory:  Has PND and episodes of orthopnea, dyspnea on exertion, anasarca. 

GI:  Denies melena, hematemesis.  Denies dysuria, hematuria. 



All other systems are reviewed and all are negative.



Physical Examination:

General:  Alert, not in acute distress.  Oriented x3. 

HEENT:  Atraumatic, normocephalic. 

Neck:  Supple.  No LAD. 

Respiratory:  Crackles present at bases. 

Cardiovascular:  3+ edema in both legs. 

Heart:  S1, S2.  Irregularly irregular. 

Gastrointestinal:  Obese, nontender.  No rebound.  No guarding. 

Extremities:  Edema present in both legs. 

Neurological:  Cranial nerves intact.  No tremor.



Laboratory Data:  WBC 5.1, hemoglobin 13.04, hematocrit 41.8, platelet count 
101,000.  INR 1.44.  Sodium 146, potassium 4.5, BUN 51, creatinine 2.22, glucose
112.  Magnesium 2.0.  Total bilirubin 0.8.  AST 18, ALT 20.  Lipase 55.  AP 89. 
Chemistries today show sodium 144, potassium 4.6, chloride 114, CO2 21, BUN 33, 
creatinine 2.12, glucose 128.  Calcium 9.1, albumin 3.2, total protein 6.3.  
Cholesterol total 98.  Urinalysis showed RBC 11 to 20, WBC greater than 50, 
glucose 3+, ketones negative, blood trace, urine protein trace.  Renal 
ultrasound did not show hydronephrosis.  Increased echogenicity of both kidneys 
bilaterally was present, likely reflecting medical renal disease.  Right kidney 
9.8 cm in length, left kidney 10.5 cm in length.



Impression And Plan:  

1.   Acute kidney injury on chronic kidney disease.  The patient has cardiorenal
syndrome, presented with congestive heart failure exacerbation.  The patient 
will continue diuretic therapy.  Urinalysis showed hematuria.  Plan is to check 
UA and urine culture and screen for urinary tract infection.  The patient is 
asymptomatic.  He denies lower urinary tract symptoms, and denies urinary 
retention.  Plan is to check bladder scan to rule out increased postvoid 
residual volume.

2.   Congestive heart failure exacerbation, acute on chronic, combined systolic 
and diastolic heart failure, NYHA IV, stage III.  Continue Lasix IV 40 mg 3 
times per day.  Continue Coreg and hydralazine.  ACE inhibitor is on hold due to
acute kidney injury.

3.   Echo showed reduced left ventricular function with severe global 
hypokinesis.  The patient is undergoing workup to rule out left ventricular 
thrombus and severe pulmonary hypertension, and acute coronary syndrome.  The 
patient will continue low-sodium diet, and Lasix for cardiorenal syndrome and 
acute kidney injury in setting of severe congestive heart failure exacerbation. 
The patient has history of atrial fibrillation.  Further recommendation from 
Primary team.

4.   Hypertension.  Continue blood pressure medication.  Monitor blood pressure 
log.

5.   Hematuria, microscopic.  Workup was started for microscopic hematuria, Anca
is test is ordered, patient will need also Urology consult for microscopic 
hematuria to rule out malignancy.  Avoid nephrotoxic medication in view of acute
kidney injury.





MARIELA/TEGAN

DD:  08/19/2024 21:47:50   Voice ID:  [id number]

DT:  08/20/2024 03:10:53   Report ID:  [id number]

MTDD

## 2024-08-20 NOTE — EKG
Test Date:    2024-08-18               Test Time:    14:21:54

Technician:   VANESSA                                   

                                                     

MEASUREMENT RESULTS:                                       

Intervals:                                           

Rate:         104                                    

IA:                                                  

QRSD:         142                                    

QT:           392                                    

QTc:          515                                    

Axis:                                                

P:                                                   

IA:                                                  

QRS:          -44                                    

T:            -90                                    

                                                     

INTERPRETIVE STATEMENTS:                                       

                                                     

Atrial fibrillation with rapid ventricular response with frequent

ventricular-paced complexes

Left axis deviation

Left bundle branch block

Abnormal ECG

Compared to ECG 03/13/2023 19:34:15

Left bundle-branch block now present

Ventricular premature complex(es) no longer present

Myocardial infarct finding no longer present

T-wave abnormality no longer present

Possible ischemia no longer present



Electronically Signed On 08-20-24 17:47:32 CDT by Bam Josue

## 2024-08-20 NOTE — P.PN
Subjective


Date of Service: 08/20/24


Chief Complaint: CHF exacerbation


Subjective: No new changes, No C/O voiced, Tolerating diet, Ambulating, 

Improving





Review of Systems


10-point ROS is otherwise unremarkable





Physical Examination





- Vital Signs


Temperature: 97.9 F


Blood Pressure: 146/94


Pulse: 89


Respirations: 12


Pulse Ox (%): 97





- Physical Exam


General: Alert, In no apparent distress


HEENT: Atraumatic, PERRLA, EOMI


Neck: Supple, JVD not distended


Respiratory: Crackles/rales


Cardiovascular: Irregular heart rate/rhythm


Gastrointestinal: Normal bowel sounds, No tenderness


Musculoskeletal: No tenderness


Integumentary: No rashes


Neurological: Normal speech, Normal tone, Normal affect


Lymphatics: No axilla or inguinal lymphadenopathy





- Studies


Medications List Reviewed: Yes





Assessment And Plan





- Current Problems (Diagnosis)


(1) Acute on chronic combined systolic and diastolic heart failure


Current Visit: Yes   Status: Acute   


Plan: 


Patient is NYHA 4, stage C.


Continue IV Lasix 40 mg TID,


continue Coreg 12.5 mg po BID


Continue Hydralazine


Monitor input and output and electrolytes


Echo shows severe reduced LV function with severe global hypokinesis and can not

rule out LV thombus with severe pulmonary hypertension and elevated filling 

pressures








(2) Atrial fibrillation


Current Visit: Yes   Status: Acute   


Plan: 


continue coreg and Eliquis











(3) HTN (hypertension)


Current Visit: Yes   Status: Acute   


Plan: 


continue medications as above.

## 2024-08-20 NOTE — ECHO
HEIGHT: 6 ft 0 in   WEIGHT: 210 lb 0 oz   DATE OF STUDY: 08/19/2024   REFER DR: Miguel Ovalles NP

2-DIMENSIONAL: YES

     M.MODE: YES

 DOPPLER: YES

COLOR FLOW: YES



                    TDS:  

PORTABLE: YES

 DEFINITY:  

BUBBLE STUDY: 





DIAGNOSIS:  CONGESTIVE HEART FAILURE EXACERBATION/ HISTORY OF EJECTION FRACTION 20%



CARDIAC HISTORY:  

CATHERIZATION: 

SURGERY: 

PROSTHETIC VALVE: 

PACEMAKER: 





MEASUREMENTS (cm)

    DIASTOLIC (NORMALS)                 SYSTOLIC (NORMALS)

IVSd                 1.0 (0.6-1.2)                    LA Diam 4.8 (1.9-4.0)     LVEF       
  10-15%  

LVIDd               5.8 (3.5-5.7)                        LVIDs      4.5 (2.0-3.5)     %FS  
        

LVPWd             1.3 (0.6-1.2)

Ao Diam           3.7 (2.0-3.7)



2 DIMENSIONAL ASSESSMENT:

RIGHT ATRIUM:                   NORMAL

LEFT ATRIUM:       MODERATE ENLARGED



RIGHT VENTRICLE:            NORMAL

LEFT VENTRICLE: SEVERE DILATED



TRICUSPID VALVE:  MILD TRICUSPID REGURGITATION

MITRAL VALVE:     NORMAL



PULMONIC VALVE:             NORMAL

AORTIC VALVE:     NORMAL



PERICARDIAL EFFUSION: NONE

AORTIC ROOT:      NORMAL





LEFT VENTRICULAR WALL MOTION:     SEVERE GLOBAL HYPOKINESIS WITH AKINETIC/ ANUERYSMAL APEX



DOPPLER/COLOR FLOW:     DIASTOLIC DYSFUNCTION



COMMENTS:      

  1. SEVERE REDUCED LEFT VENTRICULAR SYSTOLIC FUNCTION, EJECTION FRACTION 10-15%, SEVERE 
GLOBAL HYPOKINESIS WITH AKINETIC/ ANEURYSMAL APEX. CANNOT EXCLUDE LEFT VENTRICULAR APEX 
THROMBUS.

  2. DIASTOLIC DYSFUCNTION

  3. SEVERE PULMONARY HYPERTENSION (RIGHT VENTRICULAR SYSTOLIC PRESSURE GREATER THAN 60 
mmHg)

  4. ELEVATED FILLING PRESSURE (RIGHT ATRIAL PRESSURE GREATER THAN 20 mmHg)



TECHNOLOGIST:   CTA LOPES

## 2024-08-20 NOTE — RAD REPORT
EXAM DESCRIPTION:  US - Urinary Bladder - 8/20/2024 5:45 am

 

CLINICAL HISTORY:  hematuria, therese urinary retention

 

COMPARISON:  No comparisons

 

TECHNIQUE:  Real-time sonographic evaluation of the urinary bladder with pre and postvoid volume linda
urements was performed.

 

FINDINGS:  No evidence the urinary bladder mass ureterocele. Prevoid bladder volume 30 mL. Postvoid b
ladder volume 40 mL. Patient could not void successfully at the time of this study.

 

IMPRESSION:  Mild postvoid residual noted.

## 2024-08-21 VITALS — DIASTOLIC BLOOD PRESSURE: 68 MMHG | SYSTOLIC BLOOD PRESSURE: 98 MMHG

## 2024-08-21 VITALS — TEMPERATURE: 97.2 F

## 2024-08-21 LAB
ALBUMIN SERPL BCP-MCNC: 3 G/DL (ref 3.4–5)
ALBUMIN/GLOB SERPL: 0.9 {RATIO} (ref 1.1–1.8)
ALP SERPL-CCNC: 77 U/L (ref 45–117)
ALT SERPL W P-5'-P-CCNC: 16 U/L (ref 16–61)
ANION GAP SERPL CALC-SCNC: 11.8 MEQ/L (ref 5–15)
AST SERPL W P-5'-P-CCNC: 14 U/L (ref 15–37)
BUN BLD-MCNC: 33 MG/DL (ref 7–18)
GLOBULIN SER CALC-MCNC: 3.2 G/DL (ref 2.3–3.5)
GLUCOSE SERPLBLD-MCNC: 120 MG/DL (ref 74–106)
HCT VFR BLD CALC: 43 % (ref 39.6–49)
HGB BLD-MCNC: 13.6 G/DL (ref 13.6–17.9)
LYMPHOCYTES # SPEC AUTO: 1.2 K/UL (ref 0.7–4.9)
MAGNESIUM SERPL-MCNC: 1.6 MG/DL (ref 1.6–2.4)
MCH RBC QN AUTO: 27 PG (ref 27–35)
MCHC RBC AUTO-ENTMCNC: 31.6 G/DL (ref 32–36)
MCV RBC: 85.5 FL (ref 80–100)
NRBC # BLD: 0 10*3/UL (ref 0–0)
NRBC BLD AUTO-RTO: 0.1 % (ref 0–0)
PMV BLD: 11.8 FL (ref 7.6–11.3)
POTASSIUM SERPL-SCNC: 3.8 MEQ/L (ref 3.5–5.1)
RBC # BLD: 5.03 M/UL (ref 4.33–5.43)
WBC # BLD AUTO: 5.4 THOU/UL (ref 4.3–10.9)

## 2024-08-21 NOTE — PN
Date of Progress Note:  08/21/2024



Subjective:  The patient was admitted to the hospital with acute kidney injury on chronic kidney dise
ase.  Acute kidney injury secondary to cardiorenal.  The patient feeling better.



Objective:  Vital Signs:  Blood pressure of 141/87, pulse of 81. 

Chest:  Faint rales on the base. 

Heart:  S1, S2.  Systolic murmur. 

Abdomen:  Soft, nontender. 

Extremity:  No edema. 

Neuro:  Alert.  No focality.



Laboratory Data:  WBC 5.4, hemoglobin 13.7.  Sodium 141, potassium 3.8, bicarb 26, BUN 33, creatinine
 1.8, trending down.  GFR 37, calcium 8.8, magnesium 1.6.



Current Medications:  The patient on, includes:

1.Atorvastatin.

2.Carvedilol.

3.Lasix 40 b.i.d.

4.Pantoprazole.



Assessment And Plan:  

1.Acute kidney injury secondary to cardiorenal.  I am going to continue diuresing the patient, and w
e will follow up.

2.Hypomagnesemia.  We will supplement.

3.Congestive heart failure with exacerbation.  Continue Lasix, as above.

4.Hypertension.  We will utilize the blood pressure for more diuresis.





MA/TEGAN

DD:  08/21/2024 12:15:22Voice ID:  [id number]

DT:  08/21/2024 16:06:27Report ID:  [id number]

## 2024-08-21 NOTE — P.DS
Admission Date: 08/18/24


Discharge Date: 08/21/24


Disposition: ROUTINE DISCHARGE


Discharge Condition: FAIR


Reason for Admission: CHF exacerbation


Vital Signs/Physical Exam: 














Temp Pulse Resp BP Pulse Ox


 


 97.2 F   81   14   141/87 H  100 


 


 08/21/24 08:00  08/21/24 08:00  08/21/24 08:00  08/21/24 08:00  08/21/24 08:00








Laboratory Data at Discharge: 














WBC  5.40 thou/uL (4.3-10.9)   08/21/24  07:12    


 


Hgb  13.6 g/dL (13.6-17.9)   08/21/24  07:12    


 


Hct  43.0 % (39.6-49.0)   08/21/24  07:12    


 


Plt Count  85 thou/uL (152-406)  L  08/21/24  07:12    


 


PT  16.0 SECONDS (9.4-12.5)  H  08/18/24  14:14    


 


INR  1.44   08/18/24  14:14    


 


Sodium  141 mEq/L (136-145)   08/21/24  07:12    


 


Potassium  3.8 mEq/L (3.5-5.1)   08/21/24  07:12    


 


BUN  33 mg/dL (7-18)  H  08/21/24  07:12    


 


Creatinine  1.84 mg/dL (0.70-1.30)  H  08/21/24  07:12    


 


Glucose  120 mg/dL ()  H  08/21/24  07:12    


 


Uric Acid  9.3 mg/dL (3.5-7.2)  H  08/20/24  04:36    


 


Uric Acid  Cancelled   08/20/24  04:36    


 


Magnesium  1.6 mg/dL (1.6-2.4)   08/21/24  07:12    


 


Total Bilirubin  0.9 mg/dL (0.2-1.0)   08/21/24  07:12    


 


AST  14 U/L (15-37)  L  08/21/24  07:12    


 


ALT  16 U/L (16-61)   08/21/24  07:12    


 


Alkaline Phosphatase  77 U/L ()   08/21/24  07:12    


 


Triglycerides  79 mg/dL (<150)   08/19/24  04:30    


 


Cholesterol  98 mg/dL (<200)   08/19/24  04:30    


 


HDL Cholesterol  52 mg/dL (40-60)   08/19/24  04:30    


 


Cholesterol/HDL Ratio  1.88   08/19/24  04:30    


 


Lipase  35 U/L (13-75)   08/18/24  14:14    








Home Medications: 








Atorvastatin Calcium 1 tab PO BEDTIME 12/02/22 


Cyanocobalamin [Vitamin B-12*] 1 tab PO DAILY 12/02/22 


Furosemide [Lasix*] 1 tab PO BID 12/02/22 


Metformin HCl [Glucophage*] 1 tab PO BID 12/02/22 


Omeprazole 2 cap PO BID 12/02/22 


Thiamine HCl 1 tab PO DAILY 12/02/22 


Apixaban [Eliquis] 5 mg PO BID #60 tab 12/03/22 


Aspirin Chewable [Aspirin Chewable*] 81 mg PO DAILY #30 tab.chew 12/03/22 


Potassium Chloride 20 meq PO DAILY #30 tab 12/03/22 


Hydralazine [Apresoline*] 50 mg PO BID 30 Days #60 tab 08/21/24 


allopurinoL [Zyloprim*] 100 mg PO DAILY  tab 08/21/24 


carvediloL [Coreg*] 12.5 mg PO BID 6AM 6PM 30 Days #60 tab 08/21/24 





New Medications: 


Hydralazine [Apresoline*] 50 mg PO BID 30 Days #60 tab


carvediloL [Coreg*] 12.5 mg PO BID 6AM 6PM 30 Days #60 tab


Physician Discharge Instructions: 




















1. Please call and schedule a follow-up appointment with your PCP in 3-5 days


- Please follow-up with your PCP for medication refills/adjustments


2. Please call and schedule a follow-up appointment with Dr. Josue in one week


-worsening heart failure, medication adjustments


3. please call and schedule a follow-up appointment with Dr. Rojas in one 

week


3. Continue heart health diet


4. activity restrictions, fall precautions


5. Return to the ED if symptoms worsen








New or changed medications


Coreg 12.5mg twice daily


hydralazine daily


lasix 40 mg twice daily








Diet: AHA


Activity: Fall precautions


Followup: 


NONE,NONE [Primary Care Provider] - 


Rashad Rojas MD [ACTIVE - CAN ADMIT] - 


Bam Josue MD [ACTIVE - CAN ADMIT] -